# Patient Record
Sex: FEMALE | Race: WHITE | NOT HISPANIC OR LATINO | Employment: FULL TIME | URBAN - METROPOLITAN AREA
[De-identification: names, ages, dates, MRNs, and addresses within clinical notes are randomized per-mention and may not be internally consistent; named-entity substitution may affect disease eponyms.]

---

## 2018-07-24 ENCOUNTER — TRANSCRIBE ORDERS (OUTPATIENT)
Dept: ADMINISTRATIVE | Facility: HOSPITAL | Age: 48
End: 2018-07-24

## 2018-07-24 DIAGNOSIS — Z12.39 SCREENING BREAST EXAMINATION: Primary | ICD-10-CM

## 2018-07-24 DIAGNOSIS — Z00.8 HEALTH EXAMINATION IN POPULATION SURVEY: ICD-10-CM

## 2018-07-27 ENCOUNTER — APPOINTMENT (OUTPATIENT)
Dept: LAB | Facility: HOSPITAL | Age: 48
End: 2018-07-27
Payer: COMMERCIAL

## 2018-07-27 DIAGNOSIS — Z12.39 SCREENING BREAST EXAMINATION: ICD-10-CM

## 2018-07-27 DIAGNOSIS — Z00.8 HEALTH EXAMINATION IN POPULATION SURVEY: ICD-10-CM

## 2018-07-27 LAB
CHOLEST SERPL-MCNC: 122 MG/DL (ref 0–200)
EST. AVERAGE GLUCOSE BLD GHB EST-MCNC: 103 MG/DL
HBA1C MFR BLD: 5.2 % (ref 4.2–6.3)
HDLC SERPL-MCNC: 48 MG/DL (ref 40–60)
LDLC SERPL CALC-MCNC: 64 MG/DL (ref 0–100)
NONHDLC SERPL-MCNC: 74 MG/DL
TRIGL SERPL-MCNC: 51 MG/DL (ref 44–166)

## 2018-07-27 PROCEDURE — 83036 HEMOGLOBIN GLYCOSYLATED A1C: CPT

## 2018-07-27 PROCEDURE — 36415 COLL VENOUS BLD VENIPUNCTURE: CPT

## 2018-07-27 PROCEDURE — 80061 LIPID PANEL: CPT

## 2018-08-01 ENCOUNTER — HOSPITAL ENCOUNTER (OUTPATIENT)
Dept: MAMMOGRAPHY | Facility: HOSPITAL | Age: 48
Discharge: HOME | End: 2018-08-01
Attending: FAMILY MEDICINE
Payer: COMMERCIAL

## 2018-08-01 DIAGNOSIS — Z12.39 SCREENING BREAST EXAMINATION: ICD-10-CM

## 2018-08-01 PROCEDURE — 77067 SCR MAMMO BI INCL CAD: CPT

## 2018-08-01 PROCEDURE — 77063 BREAST TOMOSYNTHESIS BI: CPT

## 2019-11-01 ENCOUNTER — TRANSCRIBE ORDERS (OUTPATIENT)
Dept: LAB | Facility: HOSPITAL | Age: 49
End: 2019-11-01

## 2019-11-01 ENCOUNTER — APPOINTMENT (OUTPATIENT)
Dept: LAB | Facility: HOSPITAL | Age: 49
End: 2019-11-01
Payer: COMMERCIAL

## 2019-11-01 DIAGNOSIS — E55.9 VITAMIN D DEFICIENCY: ICD-10-CM

## 2019-11-01 DIAGNOSIS — Z13.220 SCREENING FOR LIPOID DISORDERS: ICD-10-CM

## 2019-11-01 DIAGNOSIS — E55.9 AVITAMINOSIS D: ICD-10-CM

## 2019-11-01 LAB
25(OH)D3 SERPL-MCNC: 36.3 NG/ML (ref 30–100)
ALBUMIN SERPL BCP-MCNC: 3.9 G/DL (ref 3.5–5.7)
ALP SERPL-CCNC: 52 U/L (ref 40–150)
ALT SERPL W P-5'-P-CCNC: 11 U/L (ref 7–52)
ANION GAP SERPL CALCULATED.3IONS-SCNC: 6 MMOL/L (ref 4–13)
AST SERPL W P-5'-P-CCNC: 12 U/L (ref 13–39)
BILIRUB SERPL-MCNC: 1 MG/DL (ref 0.2–1)
BUN SERPL-MCNC: 10 MG/DL (ref 7–25)
CALCIUM SERPL-MCNC: 9.2 MG/DL (ref 8.6–10.5)
CHLORIDE SERPL-SCNC: 107 MMOL/L (ref 98–107)
CHOLEST SERPL-MCNC: 130 MG/DL (ref 0–200)
CO2 SERPL-SCNC: 28 MMOL/L (ref 21–31)
CREAT SERPL-MCNC: 0.76 MG/DL (ref 0.6–1.2)
ERYTHROCYTE [DISTWIDTH] IN BLOOD BY AUTOMATED COUNT: 14.3 % (ref 11.5–14.5)
GFR SERPL CREATININE-BSD FRML MDRD: 92 ML/MIN/1.73SQ M
GLUCOSE P FAST SERPL-MCNC: 78 MG/DL (ref 65–99)
HCT VFR BLD AUTO: 43.8 % (ref 42–47)
HDLC SERPL-MCNC: 49 MG/DL
HGB BLD-MCNC: 14.3 G/DL (ref 12–16)
LDLC SERPL CALC-MCNC: 73 MG/DL (ref 0–100)
MCH RBC QN AUTO: 29.1 PG (ref 26–34)
MCHC RBC AUTO-ENTMCNC: 32.7 G/DL (ref 31–37)
MCV RBC AUTO: 89 FL (ref 81–99)
NONHDLC SERPL-MCNC: 81 MG/DL
PLATELET # BLD AUTO: 206 THOUSANDS/UL (ref 149–390)
PMV BLD AUTO: 8.3 FL (ref 8.6–11.7)
POTASSIUM SERPL-SCNC: 4 MMOL/L (ref 3.5–5.5)
PROT SERPL-MCNC: 6.6 G/DL (ref 6.4–8.9)
RBC # BLD AUTO: 4.92 MILLION/UL (ref 3.9–5.2)
SODIUM SERPL-SCNC: 141 MMOL/L (ref 134–143)
TRIGL SERPL-MCNC: 40 MG/DL (ref 44–166)
WBC # BLD AUTO: 6.3 THOUSAND/UL (ref 4.8–10.8)

## 2019-11-01 PROCEDURE — 80053 COMPREHEN METABOLIC PANEL: CPT

## 2019-11-01 PROCEDURE — 82306 VITAMIN D 25 HYDROXY: CPT

## 2019-11-01 PROCEDURE — 85027 COMPLETE CBC AUTOMATED: CPT

## 2019-11-01 PROCEDURE — 36415 COLL VENOUS BLD VENIPUNCTURE: CPT

## 2019-11-01 PROCEDURE — 80061 LIPID PANEL: CPT

## 2019-11-08 ENCOUNTER — TRANSCRIBE ORDERS (OUTPATIENT)
Dept: ADMINISTRATIVE | Facility: HOSPITAL | Age: 49
End: 2019-11-08

## 2019-11-08 DIAGNOSIS — Z12.31 SCREENING MAMMOGRAM FOR HIGH-RISK PATIENT: Primary | ICD-10-CM

## 2019-11-08 DIAGNOSIS — M81.0 OSTEOPOROSIS, UNSPECIFIED OSTEOPOROSIS TYPE, UNSPECIFIED PATHOLOGICAL FRACTURE PRESENCE: ICD-10-CM

## 2019-11-26 ENCOUNTER — HOSPITAL ENCOUNTER (OUTPATIENT)
Dept: MAMMOGRAPHY | Facility: HOSPITAL | Age: 49
Discharge: HOME | End: 2019-11-26
Payer: COMMERCIAL

## 2019-11-26 ENCOUNTER — HOSPITAL ENCOUNTER (OUTPATIENT)
Dept: BONE DENSITY | Facility: HOSPITAL | Age: 49
Discharge: HOME | End: 2019-11-26
Payer: COMMERCIAL

## 2019-11-26 VITALS — BODY MASS INDEX: 37.67 KG/M2 | HEIGHT: 67 IN | WEIGHT: 240 LBS

## 2019-11-26 DIAGNOSIS — M81.0 OSTEOPOROSIS, UNSPECIFIED OSTEOPOROSIS TYPE, UNSPECIFIED PATHOLOGICAL FRACTURE PRESENCE: ICD-10-CM

## 2019-11-26 DIAGNOSIS — Z12.31 SCREENING MAMMOGRAM FOR HIGH-RISK PATIENT: ICD-10-CM

## 2019-11-26 PROCEDURE — 77067 SCR MAMMO BI INCL CAD: CPT

## 2019-11-26 PROCEDURE — 77063 BREAST TOMOSYNTHESIS BI: CPT

## 2019-11-26 PROCEDURE — 77080 DXA BONE DENSITY AXIAL: CPT

## 2020-09-16 ENCOUNTER — TRANSCRIBE ORDERS (OUTPATIENT)
Dept: ADMINISTRATIVE | Facility: HOSPITAL | Age: 50
End: 2020-09-16

## 2020-09-16 DIAGNOSIS — R10.84 ABDOMINAL PAIN, GENERALIZED: Primary | ICD-10-CM

## 2020-10-14 ENCOUNTER — HOSPITAL ENCOUNTER (OUTPATIENT)
Dept: RADIOLOGY | Facility: HOSPITAL | Age: 50
Discharge: HOME | End: 2020-10-14
Payer: COMMERCIAL

## 2020-10-14 ENCOUNTER — TRANSCRIBE ORDERS (OUTPATIENT)
Dept: ADMINISTRATIVE | Facility: HOSPITAL | Age: 50
End: 2020-10-14

## 2020-10-14 DIAGNOSIS — M25.562 LEFT KNEE PAIN, UNSPECIFIED CHRONICITY: Primary | ICD-10-CM

## 2020-10-14 DIAGNOSIS — M25.562 LEFT KNEE PAIN, UNSPECIFIED CHRONICITY: ICD-10-CM

## 2020-10-14 PROCEDURE — 73562 X-RAY EXAM OF KNEE 3: CPT

## 2020-12-23 ENCOUNTER — IMMUNIZATIONS (OUTPATIENT)
Dept: FAMILY MEDICINE CLINIC | Facility: HOSPITAL | Age: 50
End: 2020-12-23
Payer: COMMERCIAL

## 2020-12-23 DIAGNOSIS — Z23 ENCOUNTER FOR IMMUNIZATION: ICD-10-CM

## 2020-12-23 PROCEDURE — 0011A SARS-COV-2 / COVID-19 MRNA VACCINE (MODERNA) 100 MCG: CPT

## 2020-12-23 PROCEDURE — 91301 SARS-COV-2 / COVID-19 MRNA VACCINE (MODERNA) 100 MCG: CPT

## 2021-01-15 ENCOUNTER — OFFICE VISIT (OUTPATIENT)
Dept: OBGYN CLINIC | Facility: CLINIC | Age: 51
End: 2021-01-15
Payer: COMMERCIAL

## 2021-01-15 VITALS
DIASTOLIC BLOOD PRESSURE: 89 MMHG | HEIGHT: 67 IN | HEART RATE: 69 BPM | SYSTOLIC BLOOD PRESSURE: 129 MMHG | BODY MASS INDEX: 41.75 KG/M2 | WEIGHT: 266 LBS

## 2021-01-15 DIAGNOSIS — M17.12 PRIMARY OSTEOARTHRITIS OF LEFT KNEE: Primary | ICD-10-CM

## 2021-01-15 PROCEDURE — 99203 OFFICE O/P NEW LOW 30 MIN: CPT | Performed by: ORTHOPAEDIC SURGERY

## 2021-01-15 NOTE — PROGRESS NOTES
Assessment:     1  Primary osteoarthritis of left knee          Plan:     Problem List Items Addressed This Visit        Musculoskeletal and Integument    Primary osteoarthritis of left knee - Primary    Relevant Orders    Ambulatory referral to Physical Therapy             14-year-old female with left knee osteoarthritis primarily the patellofemoral compartment but also the medial compartment  I reviewed the radiographs and diagnosis with her  We discussed the importance regular, low-impact exercise, quad muscle strengthening, weight loss, icing, anti-inflammatories  We also discussed possible injections  At this point she would like to try less invasive things 1st   If at any point she wishes to proceed with either cortisone or viscosupplementation be happy to proceed for her  Patient ID: Marcia Ashton is a 48 y o  female  Chief Complaint:  Left knee problems    HPI:  14-year-old female here today for pain in her left knee  This has been going on for several years but has been worsening  She is now at a point she cannot go up steps sequentially and has to go up one at a time  She has been taking Motrin regularly and using Voltaren gel which does not give her significant relief  She enjoys hiking but has been unable to do that because of this pain in her knee  She does fine if she sitting, but squatting and stairs cause her the most difficulty  She does not note any swelling  She has had no injury  She has not had any therapy or had any injections  She occasionally wears a brace which gives her a little bit of extra support  He is looking to see what else can be done for her knee pain  Allergy:  No Known Allergies    Medications:  all current active meds have been reviewed    Past Medical History:  History reviewed  No pertinent past medical history      Past Surgical History:  Past Surgical History:   Procedure Laterality Date    HYSTERECTOMY         Family History:  Family History Problem Relation Age of Onset    Diabetes Mother     Hypertension Mother     Hypertension Father     No Known Problems Sister     No Known Problems Maternal Grandmother     No Known Problems Maternal Grandfather     No Known Problems Paternal Grandmother     No Known Problems Paternal Grandfather     No Known Problems Sister     No Known Problems Sister     No Known Problems Sister     No Known Problems Sister     No Known Problems Maternal Aunt        Social History:  Social History     Substance and Sexual Activity   Alcohol Use None     Social History     Substance and Sexual Activity   Drug Use Not on file     Social History     Tobacco Use   Smoking Status Never Smoker   Smokeless Tobacco Never Used           ROS:  Review of Systems   Constitutional: Negative  Negative for chills and fever  HENT: Negative  Negative for ear pain and sore throat  Eyes: Negative  Negative for pain and visual disturbance  Respiratory: Negative  Negative for cough and shortness of breath  Cardiovascular: Negative  Negative for chest pain and palpitations  Gastrointestinal: Negative  Negative for abdominal pain and vomiting  Endocrine: Negative  Genitourinary: Negative  Negative for dysuria and hematuria  Musculoskeletal: Positive for arthralgias  Negative for back pain  Skin: Negative  Negative for color change and rash  Allergic/Immunologic: Negative  Neurological: Negative  Negative for seizures and syncope  Hematological: Negative  Psychiatric/Behavioral: Negative  All other systems reviewed and are negative  Objective:  BP Readings from Last 1 Encounters:   01/15/21 129/89      Wt Readings from Last 1 Encounters:   01/15/21 121 kg (266 lb)        BMI:   Estimated body mass index is 41 66 kg/m² as calculated from the following:    Height as of this encounter: 5' 7" (1 702 m)  Weight as of this encounter: 121 kg (266 lb)      EXAM:   Physical Exam  Vitals signs reviewed  Constitutional:       General: She is not in acute distress  Appearance: She is well-developed  She is not diaphoretic  HENT:      Head: Normocephalic and atraumatic  Eyes:      General:         Right eye: No discharge  Left eye: No discharge  Neck:      Musculoskeletal: Normal range of motion and neck supple  Trachea: No tracheal deviation  Cardiovascular:      Rate and Rhythm: Normal rate and regular rhythm  Pulmonary:      Effort: Pulmonary effort is normal  No respiratory distress  Breath sounds: Normal breath sounds  Chest:      Chest wall: No tenderness  Abdominal:      General: There is no distension  Palpations: Abdomen is soft  Tenderness: There is no abdominal tenderness  Musculoskeletal:      Right knee: She exhibits no effusion  Left knee: She exhibits no effusion  Skin:     General: Skin is warm and dry  Findings: No erythema  Neurological:      Mental Status: She is alert  Psychiatric:         Behavior: Behavior normal        Right Knee Exam   Right knee exam is normal     Muscle Strength   The patient has normal right knee strength  Tenderness   The patient is experiencing no tenderness  Range of Motion   The patient has normal right knee ROM  Tests   [place]:  Medial - negative Lateral - negative  Varus: negative Valgus: negative  Lachman:  Anterior - negative      Drawer:  Posterior - negative  Pivot shift: negative  Patellar apprehension: negative    Other   Erythema: absent  Sensation: normal  Pulse: present  Swelling: none  Effusion: no effusion present      Left Knee Exam   Left knee exam is normal     Tenderness   The patient is experiencing no tenderness  Range of Motion   The patient has normal left knee ROM      Tests   [place]:  Medial - negative Lateral - negative  Varus: negative Valgus: negative  Lachman:  Anterior - negative      Drawer:  Posterior - negative  Pivot shift: negative  Patellar apprehension: negative    Other   Erythema: absent  Sensation: normal  Pulse: present  Swelling: none  Effusion: no effusion present    Comments:    Mild positive patellar grind, crepitus with knee range of motion, quad atrophy              Radiographs:  I have personally reviewed pertinent films in PACS and my interpretation is  moderate degenerative changes of the left knee primarily the patellofemoral compartment with significant osteophyte formation in the medial compartment with mild osteophyte formation and joint space narrowing  Eugenio Rogers

## 2021-01-25 ENCOUNTER — IMMUNIZATIONS (OUTPATIENT)
Dept: FAMILY MEDICINE CLINIC | Facility: HOSPITAL | Age: 51
End: 2021-01-25

## 2021-01-25 DIAGNOSIS — Z23 ENCOUNTER FOR IMMUNIZATION: Primary | ICD-10-CM

## 2021-01-25 PROCEDURE — 91301 SARS-COV-2 / COVID-19 MRNA VACCINE (MODERNA) 100 MCG: CPT

## 2021-01-25 PROCEDURE — 0012A SARS-COV-2 / COVID-19 MRNA VACCINE (MODERNA) 100 MCG: CPT

## 2021-02-28 NOTE — PROGRESS NOTES
PT Evaluation     Today's date: 3/1/21  Patient name: Alejandrina Malave  : 1970  MRN: [id number]  Referring provider: Chelsea Martinez MD  Dx:   Encounter Diagnosis     ICD-10-CM    1  Primary osteoarthritis of left knee  M17 12                   Assessment  Assessment details: Alejandrina Malave is a 48 y o  female presenting to outpatient physical therapy with diagnosis of Primary osteoarthritis of left knee  (primary encounter diagnosis)  Patient's current impairments include  L knee pain, impaired soft tissue mobility, reduced L knee  range of motion, reduced L knee strength, and reduced activity tolerance  Patient's present functional limitations include  reliance on medication and/or modalities for pain relief, poor tolerance for functional mobility and activity, and difficulty completing work/HH/leisure activities  Patient to benefit from skilled outpatient physical therapy 2x/week for 6-8 weeks in order to reduce pain, maximize pain free range of motion, increase strength and stability, and improve functional mobility/functional activity in order to maximize return to prior level of function with reduced limitations  Thank you for your referral     Impairments: abnormal or restricted ROM, activity intolerance, impaired physical strength, lacks appropriate home exercise program and pain with function  Barriers to therapy: weight  Understanding of Dx/Px/POC: good   Prognosis: good    Goals  STGs to be achieved in 4 weeks:  1  Pt to demonstrate reduced subjective pain rating "at worst" by at least 2-3 points from Initial Eval in order to allow for reduced pain with ADLs and improved functional activity tolerance  2  Pt to demonstrate increased AROM of L knee  by at least 5-10 degrees in order to allow for greater ease and independence with ADLs and functional mobility     3  Pt to demonstrate foot over foot stair climing  in order to maximize joint mobility and function and allow for progression of exercise program and achievement of goals  4  Pt to demonstrate increased MMT of L quads/hams  by at least 1/2-1 grade in order to improve safety and stability with ADLs and functional mobility  LTGs to be achieved in 6-8 weeks:  1  Pt will be I with HEP in order to continue to improve quality of life and independence and reduce risk for re-injury  2  Pt to demonstrate return to hiking  without limitations or restrictions  3  Pt to demonstrate improved function as noted by achieving or exceeding predicted score on FOTO outcomes assessment tool  Plan  Patient would benefit from: skilled physical therapy  Planned modality interventions: cryotherapy  Planned therapy interventions: manual therapy, neuromuscular re-education, stretching, strengthening, patient education, therapeutic exercise, home exercise program, gait training and balance  Frequency: 2x week  Duration in weeks: 8  Plan of Care beginning date: 3/1/2021  Plan of Care expiration date: 2021  Treatment plan discussed with: patient        Subjective Evaluation    History of Present Illness  Mechanism of injury: 42-year-old female here today for pain in her left knee  This has been going on for several years but has been worsening  She is now at a point she cannot go up steps sequentially and has to go up one at a time  She has been taking Motrin regularly and using Voltaren gel which does not give her significant relief  She enjoys hiking but has been unable to do that because of this pain in her knee  She does fine  sitting, but squatting and stairs cause her the most difficulty  She does not note any swelling  She has had no injury  She has not had any therapy or had any injections  She occasionally wears a brace which gives her a little bit of extra support  She is looking to see what else can be done for her knee pain            Recurrent probem    Quality of life: fair    Pain  Current pain ratin  At best pain ratin  At worst pain ratin  Quality: tight  Relieving factors: medications, heat, rest and support (motrin)  Aggravating factors: stair climbing (bending knee to sleep)  Progression: worsening    Social Support  Steps to enter house: yes (13)  Stairs in house: no   Lives in: multiple-level home  Lives with: spouse    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[id number], manager)  Treatments  Current treatment: medication and physical therapy  Patient Goals  Patient goals for therapy: decreased pain, increased motion, increased strength and return to sport/leisure activities  Patient goal: hiking, getting in and out of [id number]        Objective     Observations     Additional Observation Details  Obese female    Palpation     Additional Palpation Details  TTP along medial patella  Mod crepitus L knee  Tight hamstrings    Tenderness     Right Knee   No tenderness in the medial joint line  Neurological Testing     Sensation     Knee   Left Knee   Intact: light touch and hot/cold discrimination    Right Knee   Intact: light touch and hot/cold discrimination     Active Range of Motion   Left Knee   Flexion: 122 degrees   Extension: 0 degrees     Right Knee   Flexion: 135 degrees   Extension: 0 degrees     Mobility   Patellar Mobility:     Right Knee   WFL: medial, lateral, superior and inferior    Strength/Myotome Testing     Left Knee   Flexion: 5  Extension: 4    Tests     Left Knee   Negative Apley's compression, patellar compression and patella-femoral grind       Swelling     Left Knee Girth Measurement (cm)   Joint line: 42 8 cm    Right Knee Girth Measurement (cm)   Joint line: 42 4 cm    Ambulation     Ambulation: Stairs   Ascend stairs: independent  Pattern: non-reciprocal  Railings: one rail  Descend stairs: independent  Pattern: reciprocal  Railings: one rail             Precautions: none    Re-eval Date: 3/29/21    Date 3/1       Visit Count 1       FOTO completed       Pain In See IE       Pain Out See IE               Manuals 3/1       Ham stretch        Quad stretch                        Neuro Re-Ed        [employer]        Tandem amb on foam                                                Ther Ex        aerobic NS L3  10'       SLRs 2 x 15       SAQs 30x       LAQs        Bridges on disk w/ball 2 x 10       S/L abd 2 x 15       Leg press        Leg ext mach        Leg flex mach        Standing march        steamboats        Fwd lunge        Lat lunge        Monster walk        Side step w/TB        Step ups  F/l/r        Ham stretch 5 x 30"               Incline calf stretch                Gait Training                        Modalities        CP prn

## 2021-03-01 ENCOUNTER — EVALUATION (OUTPATIENT)
Dept: PHYSICAL THERAPY | Facility: CLINIC | Age: 51
End: 2021-03-01
Payer: COMMERCIAL

## 2021-03-01 DIAGNOSIS — M17.12 PRIMARY OSTEOARTHRITIS OF LEFT KNEE: Primary | ICD-10-CM

## 2021-03-01 PROCEDURE — 97161 PT EVAL LOW COMPLEX 20 MIN: CPT

## 2021-03-01 PROCEDURE — 97110 THERAPEUTIC EXERCISES: CPT

## 2021-03-04 ENCOUNTER — OFFICE VISIT (OUTPATIENT)
Dept: PHYSICAL THERAPY | Facility: CLINIC | Age: 51
End: 2021-03-04
Payer: COMMERCIAL

## 2021-03-04 DIAGNOSIS — M17.12 PRIMARY OSTEOARTHRITIS OF LEFT KNEE: Primary | ICD-10-CM

## 2021-03-04 PROCEDURE — 97110 THERAPEUTIC EXERCISES: CPT

## 2021-03-04 NOTE — PROGRESS NOTES
Daily Note     Today's date: 3/4/2021  Patient name: Keeley Evans  : 1970  MRN: [id number]  Referring provider: Konstantin Salmon MD  Dx:   Encounter Diagnosis     ICD-10-CM    1  Primary osteoarthritis of left knee  M17 12                   Subjective: No complaints or concerns voiced by pt @ this time re: L knee  Says her Knee has been feeling pretty decent  Objective: See treatment diary below      Assessment: Tolerated treatment Well overall with performance of ther exer and self stretching  Would benefit from continued skilled therapy to achieve goals  Plan: Con't services 2x/week        Precautions: none    Re-eval Date: 3/29/21    Date 3/1 3 4 21      Visit Count 1 2      FOTO completed       Pain In See IE 0/10      Pain Out See IE 0/10              Manuals 3/1 3 4 21      Ham stretch  **Instructed for self 4x30"      Quad stretch  NV                      Neuro Re-Ed        Wobble board  NV      Tandem amb on foam                                                Ther Ex  3 4 21      aerobic NS L3  10' NS   L3  10'        SLRs 2 x 15  L 2 x 15        SAQs 30x L 30x/5"        LAQs  **30x/5"        Bridges on disk w/ball 2 x 10 2x10      S/L abd 2 x 15 L 2x15      Leg press        Leg ext mach        Leg flex mach        Standing march  30x Alt      steamboats        Fwd lunge        Lat lunge        Monster walk        Side step w/TB        Step ups  F/l/r  **L 1x15      Ham stretch 5 x 30" **L 5 x 30"              Incline calf stretch  **L 5 x 30"      ABD w/strap  **30x/5"      Gait Training                        Modalities  3 4 21      CP prn  *Pt declined offer for CP applic; will apply @ home, as needed

## 2021-03-08 ENCOUNTER — OFFICE VISIT (OUTPATIENT)
Dept: PHYSICAL THERAPY | Facility: CLINIC | Age: 51
End: 2021-03-08
Payer: COMMERCIAL

## 2021-03-08 DIAGNOSIS — M17.12 PRIMARY OSTEOARTHRITIS OF LEFT KNEE: Primary | ICD-10-CM

## 2021-03-08 PROCEDURE — 97110 THERAPEUTIC EXERCISES: CPT

## 2021-03-08 NOTE — PROGRESS NOTES
Daily Note     Today's date: 3/8/2021  Patient name: Vitaly Nicholas  : 1970  MRN: [id number]  Referring provider: Palmira Schaffer MD  Dx:   Encounter Diagnosis     ICD-10-CM    1  Primary osteoarthritis of left knee  M17 12                   Subjective:  No complaints or concerns voiced by pt  today re: L knee  Says she tolerated treatment well last visit, with no adverse effects other than some tightness @ L ant  shin/ankle region  Objective: See treatment diary below      Assessment: Tolerated treatment Well today overall with performance of ther exer, except for - squat down, which produced pain in L knee  Plan:  Con't services 2x/week           Precautions: none    Re-eval Date: 3/29/21    Date 3/1 3 4 21 3 8 21     Visit Count 1 2 3     FOTO completed       Pain In See IE 0/10 0/10     Pain Out See IE 0/10 0/10             Manuals 3/1 3 4 21 3 8 21     Ham stretch  **Instructed for self 4x30" self 4x30" L     Quad stretch  NV **4x/30" L                     Neuro Re-Ed        Wobble board   NV     Tandem amb on foam   NV                                             Ther Ex  3 4 21 3 8 21     aerobic NS L3  10'  NS   L3  10'       SLRs 2 x 15  L 2 x 15    L 2 x 15     SAQs 30x L 30x/5"   L 40x/5"     LAQs  **30x/5"   L 30x/5"     Bridges on disk w/ball 2 x 10 2x10 2x10     S/L abd 2 x 15 L 2x15 L 2x15     Leg press   NV     Leg ext mach        Leg flex mach        Standing march  30x Alt 30x Alt     steamboats   **L 1x20ea     Fwd lunge        Lat lunge        Monster walk        Side step w/TB        Step ups  F/l/r  **L 1x15 Fwd L  1x20     Ham stretch 5 x 30" **L 5 x 30" L 5 x 30"  *self             Incline calf stretch  **L 5 x 30" L 5 x 30"     ABD w/strap    Ball squeeze  **30x/5" 30x/5"  **Green TB  **30x/5"     Gait Training                        Modalities  3 4 21 3 8 21     CP prn  *Pt declined offer for CP applic; will apply @ home, as needed will apply @ home, as needed

## 2021-03-11 ENCOUNTER — OFFICE VISIT (OUTPATIENT)
Dept: PHYSICAL THERAPY | Facility: CLINIC | Age: 51
End: 2021-03-11
Payer: COMMERCIAL

## 2021-03-11 DIAGNOSIS — M17.12 PRIMARY OSTEOARTHRITIS OF LEFT KNEE: Primary | ICD-10-CM

## 2021-03-11 PROCEDURE — 97110 THERAPEUTIC EXERCISES: CPT | Performed by: PHYSICAL THERAPIST

## 2021-03-11 PROCEDURE — 97140 MANUAL THERAPY 1/> REGIONS: CPT

## 2021-03-11 NOTE — PROGRESS NOTES
Daily Note     Today's date: 3/11/2021  Patient name: Shania Mendiola  : 1970  MRN: [id number]  Referring provider: Eloisa Ramirez MD  Dx:   Encounter Diagnosis     ICD-10-CM    1  Primary osteoarthritis of left knee  M17 12                   Subjective: Squats aggrivates L knee  Going on vacation hiking in April      Objective: See treatment diary below      Assessment: Tolerated treatment well  Noted swelling/crepitis sub patella/ > distal patella region  Trial GISTM per MW/PT recommendation  Noted fair patella tracking with tightness noted >lateral  Patient would benefit from continued PT      Plan: Continue per plan of care  Precautions: none  Re-eval Date: 3/29/21    Date 3/1 3 4 21 3 8 21 3/11    Visit Count 1 2 3 4    FOTO completed       Pain In See IE 0/10 0/10 0    Pain Out See IE 0/10 0/10         GISTM to L patella/med/lat joint line/patella tendon with framing/sweeping/fannint, #4/3   - to pt tolerance   Pt positioned in seated  for comfort  [facility]      Pt educated potential bruising, increase soreness and encourage hydration      Manuals 3/1 3 4 21 3 8 21 10 min GISTM    Ham stretch  **Instructed for self 4x30" self 4x30" L 2x 1 min    Quad stretch  NV **4x/30" L Prone  2x 1 min                    Neuro Re-Ed        Wobble board   NV     Tandem amb on foam   NV                                             Ther Ex  3 4 21 3 8 21     aerobic NS L3  10'  NS   L3  10'   [address] [facility]  L 3 10 min    SLRs 2 x 15  L 2 x 15    L 2 x 15 L  2x10 4 way  2# place above knee    SAQs 30x L 30x/5"   L 40x/5"     LAQs  **30x/5"   L 30x/5"     Bridges on disk w/ball 2 x 10 2x10 2x10     S/L abd 2 x 15 L 2x15 L 2x15     Leg press   NV Squats  2x 10  Cues avoid knee over toes/ hinge@ pelvis with AH  2 HHA    Leg ext mach        Leg flex mach        Standing march  30x Alt 30x Alt     steamboats   **L 1x20ea     Fwd lunge        Lat lunge        Monster walk        Side step w/TB        Step ups  F/l/r  **L 1x15 Fwd L  1x20     Ham stretch 5 x 30" **L 5 x 30" L 5 x 30"  *self             Incline calf stretch  **L 5 x 30" L 5 x 30"     ABD w/strap    Ball squeeze  **30x/5" 30x/5"  **Green TB  **30x/5"     Gait Training                        Modalities  3 4 21 3 8 21     CP prn  *Pt declined offer for CP applic; will apply @ home, as needed will apply @ home, as needed

## 2021-03-15 ENCOUNTER — OFFICE VISIT (OUTPATIENT)
Dept: PHYSICAL THERAPY | Facility: CLINIC | Age: 51
End: 2021-03-15
Payer: COMMERCIAL

## 2021-03-15 ENCOUNTER — TRANSCRIBE ORDERS (OUTPATIENT)
Dept: LAB | Facility: HOSPITAL | Age: 51
End: 2021-03-15

## 2021-03-15 ENCOUNTER — APPOINTMENT (OUTPATIENT)
Dept: LAB | Facility: HOSPITAL | Age: 51
End: 2021-03-15
Attending: FAMILY MEDICINE
Payer: COMMERCIAL

## 2021-03-15 DIAGNOSIS — R10.84 ABDOMINAL PAIN, GENERALIZED: ICD-10-CM

## 2021-03-15 DIAGNOSIS — M17.12 PRIMARY OSTEOARTHRITIS OF LEFT KNEE: Primary | ICD-10-CM

## 2021-03-15 DIAGNOSIS — R10.84 ABDOMINAL PAIN, GENERALIZED: Primary | ICD-10-CM

## 2021-03-15 LAB
ALBUMIN SERPL BCP-MCNC: 3.8 G/DL (ref 3.5–5.7)
ALP SERPL-CCNC: 50 U/L (ref 40–150)
ALT SERPL W P-5'-P-CCNC: 11 U/L (ref 7–52)
ANION GAP SERPL CALCULATED.3IONS-SCNC: 10 MMOL/L (ref 4–13)
AST SERPL W P-5'-P-CCNC: 13 U/L (ref 13–39)
BILIRUB SERPL-MCNC: 0.5 MG/DL (ref 0.2–1)
BUN SERPL-MCNC: 12 MG/DL (ref 7–25)
CALCIUM SERPL-MCNC: 8.7 MG/DL (ref 8.6–10.5)
CHLORIDE SERPL-SCNC: 109 MMOL/L (ref 98–107)
CO2 SERPL-SCNC: 25 MMOL/L (ref 21–31)
CREAT SERPL-MCNC: 0.8 MG/DL (ref 0.6–1.2)
ERYTHROCYTE [DISTWIDTH] IN BLOOD BY AUTOMATED COUNT: 15.2 % (ref 11.5–14.5)
GFR SERPL CREATININE-BSD FRML MDRD: 86 ML/MIN/1.73SQ M
GLUCOSE P FAST SERPL-MCNC: 95 MG/DL (ref 65–99)
HCT VFR BLD AUTO: 42.4 % (ref 42–47)
HGB BLD-MCNC: 14 G/DL (ref 12–16)
MCH RBC QN AUTO: 29.2 PG (ref 26–34)
MCHC RBC AUTO-ENTMCNC: 33 G/DL (ref 31–37)
MCV RBC AUTO: 88 FL (ref 81–99)
PLATELET # BLD AUTO: 203 THOUSANDS/UL (ref 149–390)
PMV BLD AUTO: 8.3 FL (ref 8.6–11.7)
POTASSIUM SERPL-SCNC: 4 MMOL/L (ref 3.5–5.5)
PROT SERPL-MCNC: 6.4 G/DL (ref 6.4–8.9)
RBC # BLD AUTO: 4.79 MILLION/UL (ref 3.9–5.2)
SODIUM SERPL-SCNC: 144 MMOL/L (ref 134–143)
WBC # BLD AUTO: 5.2 THOUSAND/UL (ref 4.8–10.8)

## 2021-03-15 PROCEDURE — 36415 COLL VENOUS BLD VENIPUNCTURE: CPT

## 2021-03-15 PROCEDURE — 80053 COMPREHEN METABOLIC PANEL: CPT

## 2021-03-15 PROCEDURE — 85027 COMPLETE CBC AUTOMATED: CPT

## 2021-03-15 PROCEDURE — 97110 THERAPEUTIC EXERCISES: CPT

## 2021-03-15 NOTE — PROGRESS NOTES
Daily Note     Today's date: 3/15/2021  Patient name: Bettyjane Bence  : 1970  MRN: [id number]  Referring provider: Indra Rodas MD  Dx:   Encounter Diagnosis     ICD-10-CM    1  Primary osteoarthritis of left knee  M17 12                   Subjective:  Pt's only c/o is "mild ache" @ Med L knee  Objective: See treatment diary below      Assessment: Tolerated treatment Fairly Well to Well overall, except for stand squat, which created pain @ L med knee  Held on remaining reps for Squats due to sx when attempting to perform this exercise  Plan: Con't services 2x/week  Precautions: none  Re-eval Date: 3/29/21    Date 3/1 3 4 21 3 8 21 3/11 3 15 21   Visit Count 1 2 3 4 5   FOTO completed       Pain In See IE 0/10 0/10 0 0/10  Mild ache Med L knee   Pain Out See IE 0/10 0/10  0/10       GISTM to L patella/med/lat joint line/patella tendon with framing/sweeping/fannint, #4/3   - to pt tolerance *NP*  Pt positioned in seated  for comfort  [facility]/11/21      Pt educated potential bruising, increase soreness and encourage hydration      Manuals 3/1 3 4 21 3 8 21 10 min GISTM 3 15 21   Ham stretch  **Instructed for self 4x30" self 4x30" L 2x 1 min 2x 1 min   Quad stretch  NV **4x/30" L Prone  2x 1 min Prone  2x 1 min                   Neuro Re-Ed        Wobble board   NV  **F/B 30x side-side 30x   Tandem amb on foam   NV  NV                                           Ther Ex  3 4 21 3 8 21  3 15 21   aerobic NS L3  10'  NS   L3  10'   [address] [facility]  L 3 10 min [facility] [facility]  L 4 10 min   SLRs 2 x 15  L 2 x 15    L 2 x 15 L  2x10 4 way  2# place above knee L  2x10 4 way  2# place above knee   SAQs 30x L 30x/5"   L 40x/5"  L 40x/5"   LAQs  **30x/5"   L 30x/5"  L 40x/5"   Bridges on disk w/ball 2 x 10 2x10 2x10     S/L abd 2 x 15 L 2x15 L 2x15  L 2x15   Leg press   NV Squats  2x 10  Cues avoid knee over toes/ hinge@ pelvis with AH  2 HHA Hold   Leg ext mach        Leg flex mach        Standing march 30x Alt 30x Alt 30x Alt 40x Alt   steamboats   **L 1x20ea L 1x20ea L 1x25ea     Fwd lunge        Lat lunge        Monster walk        Side step w/TB        Step ups  F/l/r  **L 1x15 Fwd L  1x20 Fwd L  1x20 Fwd L  1x25   Ham stretch 5 x 30" **L 5 x 30" L 5 x 30"  *self L 5 x 30"  *self Manual   (see above)           Incline calf stretch  **L 5 x 30" L 5 x 30" L 5 x 30" L 5 x 30"   ABD w/strap    Ball squeeze  **30x/5" 30x/5"  **Green TB  **30x/5" 30x/5"  **Green TB  **30x/5" 30x/5"    30x/5"   Gait Training                        Modalities  3 4 21 3 8 21  3 15 21   CP prn  *Pt declined offer for CP applic; will apply @ home, as needed will apply @ home, as needed will apply @ home, as needed 10 min to L knee, w/LE elevated

## 2021-03-22 ENCOUNTER — OFFICE VISIT (OUTPATIENT)
Dept: PHYSICAL THERAPY | Facility: CLINIC | Age: 51
End: 2021-03-22
Payer: COMMERCIAL

## 2021-03-22 ENCOUNTER — HOSPITAL ENCOUNTER (OUTPATIENT)
Dept: CT IMAGING | Facility: HOSPITAL | Age: 51
Discharge: HOME | End: 2021-03-22
Attending: FAMILY MEDICINE
Payer: COMMERCIAL

## 2021-03-22 DIAGNOSIS — R10.84 ABDOMINAL PAIN, GENERALIZED: ICD-10-CM

## 2021-03-22 DIAGNOSIS — M17.12 PRIMARY OSTEOARTHRITIS OF LEFT KNEE: Primary | ICD-10-CM

## 2021-03-22 PROCEDURE — 74177 CT ABD & PELVIS W/CONTRAST: CPT

## 2021-03-22 PROCEDURE — G1004 CDSM NDSC: HCPCS

## 2021-03-22 PROCEDURE — 97140 MANUAL THERAPY 1/> REGIONS: CPT

## 2021-03-22 PROCEDURE — 97110 THERAPEUTIC EXERCISES: CPT

## 2021-03-22 RX ADMIN — IOHEXOL 100 ML: 350 INJECTION, SOLUTION INTRAVENOUS at 07:10

## 2021-03-22 NOTE — PROGRESS NOTES
Daily Note     Today's date: 3/22/2021  Patient name: Lupe Fajardo  : 1970  MRN: [id number]  Referring provider: Meaghan Garcia MD  Dx:   Encounter Diagnosis     ICD-10-CM    1  Primary osteoarthritis of left knee  M17 12                   Subjective:  Pt  states her L knee is not too bad right now  Objective: See treatment diary below  *Pictoral HEP provided*    Assessment: Tolerated treatment Fairly Well to Well overall with performance of ther exer  and NM Re-ed  Noted pain/sx  @ Med L knee when performing stand Squats  Plan:  Con't services 2x/week           Precautions: none  Re-eval Date: 3/29/21    Date 3 22 21 3 4 21 3 8 21 3/11 3 15 21   Visit Count 6 2 3 4 5   FOTO Due NV       Pain In 0/10  Mild ache, Med L knee 0/10 0/10 0 0/10  Mild ache Med L knee   Pain Out 0/10 0/10 0/10  0/10       GISTM to L patella/med/lat joint line/patella tendon with framing/sweeping/fannint, #4/3   - to pt tolerance *NP*        Manuals 3 22 21 3 4 21 3 8 21 10 min GISTM 3 15 21   Ham stretch 2x 1 min **Instructed for self 4x30" self 4x30" L 2x 1 min 2x 1 min   Quad stretch 2x 1 min NV **4x/30" L Prone  2x 1 min Prone  2x 1 min   Gastroc 2x 1 min       Knee Flex 2x 1 min       Neuro Re-Ed 3 22 21       Wobble board F/B 30x side-side 30x  NV  **F/B 30x side-side 30x   Tandem amb on foam **4x12 ft  NV  NV   Side step on Aero Mat **5x/12 ft       Tandem Stance **feet staggered, B position  10x/5"                               Ther Ex 3 22 21 3 4 21 3 8 21  3 15 21   aerobic SRC  L 4 10 min  NS   L3  10'   [facility]  L 3 10 min [facility]  L 4 10 min   SLRs *Reviewed for HEP  L 2 x 15    L 2 x 15 L  2x10 4 way  2# place above knee L  2x10 4 way  2# place above knee   SAQs *Reviewed for HEP L 30x/5"   L 40x/5"  L 40x/5"   LAQs *Reviewed for HEP **30x/5"   L 30x/5"  L 40x/5"   Bridges on disk w/ball 2 x 10 2x10 2x10     S/L abd *Reviewed for HEP L 2x15 L 2x15  L 2x15   Leg press NV  NV Squats  2x 10  Cues avoid knee over toes/ hinge@ pelvis with AH  2 HHA Hold   Leg ext mach NV         Leg flex mach NV       Standing march 40x Alt 30x Alt 30x Alt 30x Alt 40x Alt   steamboats L 1x25ea  **L 1x20ea L 1x20ea L 1x25ea     Fwd lunge        Lat lunge        Monster walk        Side step w/TB **On AeroMat  5x12ft       Step ups  F/l/r Fwd L  1x25  **Lat 1x10 **L 1x15 Fwd L  1x20 Fwd L  1x20 Fwd L  1x25   Ham stretch Manual   (see above) **L 5 x 30" L 5 x 30"  *self L 5 x 30"  *self Manual   (see above)           Incline calf stretch L 5 x 30" **L 5 x 30" L 5 x 30" L 5 x 30" L 5 x 30"   ABD w/strap    Ball squeeze 30x/5"    30x/5" **30x/5" 30x/5"  **Green TB  **30x/5" 30x/5"  **Green TB  **30x/5" 30x/5"    30x/5"   Gait Training                        Modalities 3 22 21 3 4 21 3 8 21  3 15 21   CP prn *Pt declined offer for CP applic; will apply @ home, as needed *Pt declined offer for CP applic; will apply @ home, as needed will apply @ home, as needed will apply @ home, as needed 10 min to L knee, w/LE elevated

## 2021-03-24 ENCOUNTER — OFFICE VISIT (OUTPATIENT)
Dept: OBGYN CLINIC | Facility: CLINIC | Age: 51
End: 2021-03-24
Payer: COMMERCIAL

## 2021-03-24 VITALS
DIASTOLIC BLOOD PRESSURE: 93 MMHG | HEART RATE: 82 BPM | WEIGHT: 270.4 LBS | BODY MASS INDEX: 42.44 KG/M2 | HEIGHT: 67 IN | SYSTOLIC BLOOD PRESSURE: 143 MMHG

## 2021-03-24 DIAGNOSIS — M17.12 PRIMARY OSTEOARTHRITIS OF LEFT KNEE: Primary | ICD-10-CM

## 2021-03-24 PROCEDURE — 20610 DRAIN/INJ JOINT/BURSA W/O US: CPT | Performed by: ORTHOPAEDIC SURGERY

## 2021-03-24 PROCEDURE — 99213 OFFICE O/P EST LOW 20 MIN: CPT | Performed by: ORTHOPAEDIC SURGERY

## 2021-03-24 RX ORDER — ASPIRIN 81 MG/1
81 TABLET ORAL DAILY
COMMUNITY

## 2021-03-24 RX ORDER — HYDROCHLOROTHIAZIDE 12.5 MG/1
12.5 TABLET ORAL DAILY
COMMUNITY

## 2021-03-24 RX ORDER — BETAMETHASONE SODIUM PHOSPHATE AND BETAMETHASONE ACETATE 3; 3 MG/ML; MG/ML
6 INJECTION, SUSPENSION INTRA-ARTICULAR; INTRALESIONAL; INTRAMUSCULAR; SOFT TISSUE
Status: COMPLETED | OUTPATIENT
Start: 2021-03-24 | End: 2021-03-24

## 2021-03-24 RX ORDER — LIDOCAINE HYDROCHLORIDE 10 MG/ML
5 INJECTION, SOLUTION INFILTRATION; PERINEURAL
Status: COMPLETED | OUTPATIENT
Start: 2021-03-24 | End: 2021-03-24

## 2021-03-24 RX ADMIN — LIDOCAINE HYDROCHLORIDE 5 ML: 10 INJECTION, SOLUTION INFILTRATION; PERINEURAL at 14:09

## 2021-03-24 RX ADMIN — BETAMETHASONE SODIUM PHOSPHATE AND BETAMETHASONE ACETATE 6 MG: 3; 3 INJECTION, SUSPENSION INTRA-ARTICULAR; INTRALESIONAL; INTRAMUSCULAR; SOFT TISSUE at 14:09

## 2021-03-24 NOTE — PROGRESS NOTES
Assessment:     1  Primary osteoarthritis of left knee          Plan:     Problem List Items Addressed This Visit        Musculoskeletal and Integument    Primary osteoarthritis of left knee - Primary             49-year-old female with left knee osteoarthritis primarily the patellofemoral compartment but also the medial compartment  I would like her to continue working with physical therapy as I do think getting her strength improved still will really help her  I did give her cortisone injection today  We discussed the option of viscosupplementation versus repeat cortisone injections in the future  She shows good understanding  Follow-up as needed  Patient ID: Cielo Erwin is a 48 y o  female  Chief Complaint:  Left knee problems    HPI:  49-year-old female here today for pain in her left knee  On her last visit she was diagnosed with osteoarthritis of the knee  She started working with physical therapy  She does note some improvement in her strength but still has trouble going up the steps sequentially  She is going on vacation in a little over a week and is interested in an injection prior to her leaving  She will be doing a lot of hiking during that time period      Allergy:  No Known Allergies    Medications:  all current active meds have been reviewed    Past Medical History:  History reviewed  No pertinent past medical history      Past Surgical History:  Past Surgical History:   Procedure Laterality Date    HYSTERECTOMY         Family History:  Family History   Problem Relation Age of Onset    Diabetes Mother     Hypertension Mother     Hypertension Father     No Known Problems Sister     No Known Problems Maternal Grandmother     No Known Problems Maternal Grandfather     No Known Problems Paternal Grandmother     No Known Problems Paternal Grandfather     No Known Problems Sister     No Known Problems Sister     No Known Problems Sister     No Known Problems Sister     No Known Problems Maternal Aunt        Social History:  Social History     Substance and Sexual Activity   Alcohol Use None     Social History     Substance and Sexual Activity   Drug Use Not on file     Social History     Tobacco Use   Smoking Status Never Smoker   Smokeless Tobacco Never Used           ROS:  Review of Systems   Musculoskeletal: Positive for arthralgias  All other systems reviewed and are negative  Objective:  BP Readings from Last 1 Encounters:   03/24/21 143/93      Wt Readings from Last 1 Encounters:   03/24/21 123 kg (270 lb 6 4 oz)        BMI:   Estimated body mass index is 42 35 kg/m² as calculated from the following:    Height as of this encounter: 5' 7" (1 702 m)  Weight as of this encounter: 123 kg (270 lb 6 4 oz)  EXAM:   Physical Exam  Right Knee Exam   Right knee exam is normal     Muscle Strength   The patient has normal right knee strength  Tenderness   The patient is experiencing no tenderness  Range of Motion   The patient has normal right knee ROM  Tests   [place]:  Medial - negative Lateral - negative  Varus: negative Valgus: negative  Lachman:  Anterior - negative      Drawer:  Posterior - negative  Pivot shift: negative  Patellar apprehension: negative    Other   Erythema: absent  Sensation: normal  Pulse: present  Swelling: none      Left Knee Exam   Left knee exam is normal     Tenderness   The patient is experiencing no tenderness  Range of Motion   The patient has normal left knee ROM      Tests   [place]:  Medial - negative Lateral - negative  Varus: negative Valgus: negative  Lachman:  Anterior - negative      Drawer:  Posterior - negative  Pivot shift: negative  Patellar apprehension: negative    Other   Erythema: absent  Sensation: normal  Pulse: present  Swelling: none    Comments:    Mild positive patellar grind, crepitus with knee range of motion, quad atrophy                Radiographs:  I have personally reviewed pertinent films in PACS and my interpretation is  moderate degenerative changes of the left knee primarily the patellofemoral compartment with significant osteophyte formation in the medial compartment with mild osteophyte formation and joint space narrowing       Large joint arthrocentesis: L knee  Universal Protocol:  Consent given by: patient  Time out: Immediately prior to procedure a "time out" was called to verify the correct patient, procedure, equipment, support staff and site/side marked as required    Supporting Documentation  Indications: pain   Procedure Details  Location: knee - L knee  Preparation: Patient was prepped and draped in the usual sterile fashion  Needle size: 22 G  Ultrasound guidance: no  Approach: superior  Medications administered: 6 mg betamethasone acetate-betamethasone sodium phosphate 6 (3-3) mg/mL; 5 mL lidocaine 1 %    Patient tolerance: patient tolerated the procedure well with no immediate complications  Dressing:  Sterile dressing applied

## 2021-03-25 ENCOUNTER — OFFICE VISIT (OUTPATIENT)
Dept: PHYSICAL THERAPY | Facility: CLINIC | Age: 51
End: 2021-03-25
Payer: COMMERCIAL

## 2021-03-25 DIAGNOSIS — M17.12 PRIMARY OSTEOARTHRITIS OF LEFT KNEE: Primary | ICD-10-CM

## 2021-03-25 PROCEDURE — 97110 THERAPEUTIC EXERCISES: CPT

## 2021-03-25 PROCEDURE — 97112 NEUROMUSCULAR REEDUCATION: CPT

## 2021-03-25 NOTE — PROGRESS NOTES
Daily Note     Today's date: 3/25/2021  Patient name: Lynette Mart  : 1970  MRN: [id number]  Referring provider: Dom Daly MD  Dx:   Encounter Diagnosis     ICD-10-CM    1  Primary osteoarthritis of left knee  M17 12                   Subjective:  Pt  reports she received cortisone injection yesterday for L Knee by Ortho MD   Pt  states she is noting good results thus far  Objective: See treatment diary below      Assessment: Tolerated treatment Fairly Well overall with performance of ther exer  Able to progress with exercise program w/o any issues  Plan:  Con't services 2x/week           Precautions: none  Re-eval Date: 3/29/21    Date 3 22 21 3 25 21 3 8 21 3/11 3 15 21   Visit Count 6 7 3 4 5   FOTO Due NV **completed      Pain In 0/10  Mild ache, Med L knee 0/10  "just a little tight" 0/10 0 0/10  Mild ache Med L knee   Pain Out 0/10 0/10 0/10  0/10       GISTM to L patella/med/lat joint line/patella tendon with framing/sweeping/fannint, #4/3   - to pt tolerance *NP*        Manuals 3 22 21 3 25 21 3 8 21 10 min GISTM 3 15 21   Ham stretch 2x 1 min Self self 4x30" L 2x 1 min 2x 1 min   Quad stretch 2x 1 min Self HEP **4x/30" L Prone  2x 1 min Prone  2x 1 min   Gastroc 2x 1 min **On incline board  L 6x/30"      Knee Flex 2x 1 min **on 2nd step  10x/10"      Neuro Re-Ed 3 22 21 3 25 21      Wobble board F/B 30x side-side 30x F/B 30x-Unilat hold side-side 30x Unilat Hold NV  **F/B 30x side-side 30x   Tandem amb on foam **4x12 ft 4x12 ft NV  NV   Side step on Aero Mat **5x/12 ft 5x12 ft      Tandem Stance **feet staggered, B position  10x/5" feet staggered, B position  10x/10"                              Ther Ex 3 22 21 3 25 21 3 8 21  3 15 21   aerobic SRC  L 4 10 min SRC  Gear 5   10 min NS   L3  10'   [address] [facility]  L 3 10 min SRC  L 4 10 min   SLRs *Reviewed for HEP HEP    L 2 x 15 L  2x10 4 way  2# place above knee L  2x10 4 way  2# place above knee   SAQs *Reviewed for HEP HEP   L 40x/5"  L 40x/5"   LAQs *Reviewed for HEP HEP   L 30x/5"  L 40x/5"   Bridges on disk w/ball 2 x 10 3x10/3" 2x10     S/L abd *Reviewed for HEP HEP L 2x15  L 2x15   Leg press NV **70# 30x NV Squats  2x 10  Cues avoid knee over toes/ hinge@ pelvis with AH  2 HHA Hold   Leg ext mach NV   **22# 3x10      Leg flex mach NV **33# 3x10      Standing march 40x Alt 45x Alt 30x Alt 30x Alt 40x Alt   steamboats L 1x25ea 1x30ea B **L 1x20ea L 1x20ea L 1x25ea     Fwd lunge        Lat lunge        Monster walk        Side step w/TB **On AeroMat  5x12ft On AeroMat  5x12ft      Step ups  F/l/r Fwd L  1x25  **Lat 1x10 Fwd 1x30  Lat 20x Fwd L  1x20 Fwd L  1x20 Fwd L  1x25   Ham stretch Manual   (see above) Self L 5 x 30"  *self L 5 x 30"  *self Manual   (see above)   Stand squat  **1/4 squat  2x10      Incline calf stretch L 5 x 30" L 6 x 30" L 5 x 30" L 5 x 30" L 5 x 30"   ABD w/strap    Ball squeeze 30x/5"    30x/5" Resume    resume 30x/5"  **Green TB  **30x/5" 30x/5"  **Green TB  **30x/5" 30x/5"    30x/5"   Gait Training                        Modalities 3 22 21 3 25 21 3 8 21  3 15 21   CP prn *Pt declined offer for CP applic; will apply @ home, as needed *Pt declined offer for CP applic; will apply @ home, as needed will apply @ home, as needed will apply @ home, as needed 10 min to L knee, w/LE elevated

## 2021-03-29 ENCOUNTER — EVALUATION (OUTPATIENT)
Dept: PHYSICAL THERAPY | Facility: CLINIC | Age: 51
End: 2021-03-29
Payer: COMMERCIAL

## 2021-03-29 DIAGNOSIS — M17.12 PRIMARY OSTEOARTHRITIS OF LEFT KNEE: Primary | ICD-10-CM

## 2021-03-29 PROCEDURE — 97110 THERAPEUTIC EXERCISES: CPT

## 2021-03-29 PROCEDURE — 97112 NEUROMUSCULAR REEDUCATION: CPT

## 2021-03-29 NOTE — PROGRESS NOTES
PT Re-Evaluation  and PT Discharge    Today's date: 3/1/21  Patient name: Alejandrina Malave  : 1970  MRN: [id number]  Referring provider: Chelsea Martinez MD  Dx:   No diagnosis found  Assessment  Assessment details: Alejandrina Malave is a 48 y o  female presenting to outpatient physical therapy with diagnosis of Primary osteoarthritis of left knee  (primary encounter diagnosis)  Patient's current impairments include  L knee pain, impaired soft tissue mobility, reduced L knee  range of motion, reduced L knee strength, and reduced activity tolerance  Patient's present functional limitations include  reliance on medication and/or modalities for pain relief, poor tolerance for functional mobility and activity, and difficulty completing work/HH/leisure activities  Patient to benefit from skilled outpatient physical therapy 2x/week for 6-8 weeks in order to reduce pain, maximize pain free range of motion, increase strength and stability, and improve functional mobility/functional activity in order to maximize return to prior level of function with reduced limitations  Thank you for your referral     3/29/21 PDATE: Pt notes approx 50% improvement since [facility] with increased strength and AROM L knee  She notes improved ability to climb stairs with less pain and improved ability to squat  She will be going on a 2 week vacation invoving a lot of hiking  Pt wishes to be placed on hold at this time  She will see how she fares on her vacation and will call us upon return if she needs to resume PT  Impairments: abnormal or restricted ROM, activity intolerance, impaired physical strength, lacks appropriate home exercise program and pain with function  Barriers to therapy: weight  Understanding of Dx/Px/POC: good   Prognosis: good    Goals  STGs to be achieved in 4 weeks:  1   Pt to demonstrate reduced subjective pain rating "at worst" by at least 2-3 points from Initial Eval in order to allow for reduced pain with ADLs and improved functional activity tolerance  MET  2  Pt to demonstrate increased AROM of L knee  by at least 5-10 degrees in order to allow for greater ease and independence with ADLs and functional mobility  MET  3  Pt to demonstrate foot over foot stair climing  in order to maximize joint mobility and function and allow for progression of exercise program and achievement of goals  MET  4  Pt to demonstrate increased MMT of L quads/hams  by at least 1/2-1 grade in order to improve safety and stability with ADLs and functional mobility  MET    LTGs to be achieved in 6-8 weeks:  1  Pt will be I with HEP in order to continue to improve quality of life and independence and reduce risk for re-injury  MET  2  Pt to demonstrate return to hiking  without limitations or restrictions  3  Pt to demonstrate improved function as noted by achieving or exceeding predicted score on FOTO outcomes assessment tool  Plan  Plan details: Will hold PT chart for 1 month in case symptoms worsen  Pt to go on 2 week hiking vacation with 23 yo son and is unsure how she will feel afterward  4/26/21  DC PT        Subjective Evaluation    History of Present Illness  Mechanism of injury: 43-year-old female here today for pain in her left knee  This has been going on for several years but has been worsening  She is now at a point she cannot go up steps sequentially and has to go up one at a time  She has been taking Motrin regularly and using Voltaren gel which does not give her significant relief  She enjoys hiking but has been unable to do that because of this pain in her knee  She does fine  sitting, but squatting and stairs cause her the most difficulty  She does not note any swelling  She has had no injury  She has not had any therapy or had any injections  She occasionally wears a brace which gives her a little bit of extra support  She is looking to see what else can be done for her knee pain    3/29/21 UPDATE: Pt states she feels ~ 50% beteer than at [facility]  She notes improved ability to climb stairs with less pain, she is able to squat with less pain than at [facility], Pt had a cortisone injection last week which has helped as well  Still "feels it" when squatting more than 10 reps  Taking less motrin than at [facility]  Recurrent probem    Quality of life: good    Pain  Current pain ratin  At best pain ratin  At worst pain ratin  Quality: tight  Relieving factors: medications, heat, rest and support (motrin)  Aggravating factors: stair climbing (bending knee to sleep)  Progression: worsening    Social Support  Steps to enter house: yes (13)  Stairs in house: no   Lives in: multiple-level home  Lives with: spouse    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[id number], manager)  Treatments  Current treatment: medication and physical therapy  Patient Goals  Patient goals for therapy: decreased pain, increased motion, increased strength and return to sport/leisure activities  Patient goal: hiking, getting in and out of kayak        Objective     Observations     Additional Observation Details  Obese female    Palpation     Additional Palpation Details  TTP along medial patella  Mod crepitus L knee  Tight hamstrings    Tenderness     Right Knee   No tenderness in the medial joint line  Neurological Testing     Sensation     Knee   Left Knee   Intact: light touch and hot/cold discrimination    Right Knee   Intact: light touch and hot/cold discrimination     Active Range of Motion   Left Knee   Flexion: 130 degrees   Extension: 0 degrees     Right Knee   Flexion: 135 degrees   Extension: 0 degrees     Mobility   Patellar Mobility:     Right Knee   WFL: medial, lateral, superior and inferior    Strength/Myotome Testing     Left Knee   Flexion: 5  Extension: 5    Right Knee   Normal strength    Tests     Left Knee   Negative Apley's compression, patellar compression and patella-femoral grind       Swelling     Left Knee Girth Measurement (cm) Joint line: 42 8 cm    Right Knee Girth Measurement (cm)   Joint line: 42 4 cm    Ambulation     Ambulation: Stairs   Ascend stairs: independent  Pattern: non-reciprocal  Railings: one rail  Descend stairs: independent  Pattern: reciprocal  Railings: one rail             Precautions: none    Re-eval Date: 3/29/21      Date 3 22 21 3 25 21    3/29     Visit Count 6 7   8     FOTO Due NV **completed      Pain In 0/10  Mild ache, Med L knee 0/10  "just a little tight"      Pain Out 0/10 0/10          Manuals 3 22 21 3 25 21 3/29     Ham stretch 2x 1 min Self      Quad stretch 2x 1 min Self HEP      Gastroc 2x 1 min **On incline board  L 6x/30" Incline    6 x 30"     Knee Flex 2x 1 min **on 2nd step  10x/10"      Neuro Re-Ed 3 22 21 3 25 21 3/29     Wobble board F/B 30x side-side 30x F/B 30x-Unilat hold side-side 30x Unilat Hold F/B 30x-Unilat hold side-side 30x Unilat Hold     Tandem amb on foam **4x12 ft 4x12 ft 5 x 12 ft     Side step on Aero Mat **5x/12 ft 5x12 ft 5 x 12 ft     Tandem Stance **feet staggered, B position  10x/5" feet staggered, B position  10x/10" feet staggered, B position  10x/10"                             Ther Ex 3 22 21 3 25 21 3/29 21     aerobic SRC  L 4 10 min SRC  Gear 5   10 min NS  L4 10'     SLRs *Reviewed for HEP HEP        SAQs *Reviewed for HEP HEP        LAQs *Reviewed for HEP HEP        Bridges on disk w/ball 2 x 10 3x10/3"      S/L abd *Reviewed for HEP HEP      Leg press NV **70# 30x 70#  30x     Leg ext mach NV   **22# 3x10 22#  30x      Leg flex mach NV **33# 3x10 33#  30x     Standing march 40x Alt 45x Alt   45x alt     steamboats L 1x25ea 1x30ea B x30  B     Fwd lunge        Lat lunge        Monster walk        Side step w/TB **On AeroMat  5x12ft On AeroMat  5x12ft      Step ups  F/l/r Fwd L  1x25  **Lat 1x10 Fwd 1x30  Lat 20x   Fwd  30    Lat 30     Ham stretch Manual   (see above) Self      Stand squat  **1/4 squat  2x10 1/4  20x     Incline calf stretch L 5 x 30" L 6 x 30"  L 6 x 30"     ABD w/strap    Ball squeeze 30x/5"    30x/5" Resume    resume      Gait Training                        Modalities 3 22 21 3 25 21      CP prn *Pt declined offer for CP applic; will apply @ home, as needed *Pt declined offer for CP applic; will apply @ home, as needed

## 2021-07-15 ENCOUNTER — TELEPHONE (OUTPATIENT)
Dept: BARIATRICS | Facility: CLINIC | Age: 51
End: 2021-07-15

## 2021-07-15 NOTE — TELEPHONE ENCOUNTER
Called pt to discuss revision process/ incoming transfer  Transfer sheet placed in file cabinet under "M", until records are received

## 2021-08-02 ENCOUNTER — APPOINTMENT (EMERGENCY)
Dept: CT IMAGING | Facility: HOSPITAL | Age: 51
End: 2021-08-02
Payer: COMMERCIAL

## 2021-08-02 ENCOUNTER — APPOINTMENT (EMERGENCY)
Dept: RADIOLOGY | Facility: HOSPITAL | Age: 51
End: 2021-08-02
Payer: COMMERCIAL

## 2021-08-02 ENCOUNTER — HOSPITAL ENCOUNTER (EMERGENCY)
Facility: HOSPITAL | Age: 51
End: 2021-08-03
Attending: EMERGENCY MEDICINE | Admitting: EMERGENCY MEDICINE
Payer: COMMERCIAL

## 2021-08-02 DIAGNOSIS — K56.609 SBO (SMALL BOWEL OBSTRUCTION) (HCC): Primary | ICD-10-CM

## 2021-08-02 DIAGNOSIS — R11.2 NAUSEA AND VOMITING: ICD-10-CM

## 2021-08-02 LAB
ALBUMIN SERPL BCP-MCNC: 4.1 G/DL (ref 3.5–5.7)
ALP SERPL-CCNC: 63 U/L (ref 40–150)
ALT SERPL W P-5'-P-CCNC: 10 U/L (ref 7–52)
ANION GAP SERPL CALCULATED.3IONS-SCNC: 10 MMOL/L (ref 4–13)
AST SERPL W P-5'-P-CCNC: 11 U/L (ref 13–39)
BACTERIA UR QL AUTO: ABNORMAL /HPF
BILIRUB SERPL-MCNC: 1.2 MG/DL (ref 0.2–1)
BILIRUB UR QL STRIP: NEGATIVE
BUN SERPL-MCNC: 11 MG/DL (ref 7–25)
CALCIUM SERPL-MCNC: 9.5 MG/DL (ref 8.6–10.5)
CHLORIDE SERPL-SCNC: 102 MMOL/L (ref 98–107)
CLARITY UR: CLEAR
CO2 SERPL-SCNC: 24 MMOL/L (ref 21–31)
COLOR UR: YELLOW
CREAT SERPL-MCNC: 0.79 MG/DL (ref 0.6–1.2)
ERYTHROCYTE [DISTWIDTH] IN BLOOD BY AUTOMATED COUNT: 14.3 % (ref 11.5–14.5)
GFR SERPL CREATININE-BSD FRML MDRD: 87 ML/MIN/1.73SQ M
GLUCOSE SERPL-MCNC: 153 MG/DL (ref 65–99)
GLUCOSE UR STRIP-MCNC: NEGATIVE MG/DL
HCT VFR BLD AUTO: 51.6 % (ref 42–47)
HGB BLD-MCNC: 17.2 G/DL (ref 12–16)
HGB UR QL STRIP.AUTO: ABNORMAL
KETONES UR STRIP-MCNC: ABNORMAL MG/DL
LACTATE SERPL-SCNC: 1.4 MMOL/L (ref 0.5–2)
LEUKOCYTE ESTERASE UR QL STRIP: NEGATIVE
LIPASE SERPL-CCNC: <10 U/L (ref 11–82)
LYMPHOCYTES # BLD AUTO: 1.42 THOUSAND/UL (ref 0.6–4.47)
LYMPHOCYTES # BLD AUTO: 9 % (ref 20–51)
MCH RBC QN AUTO: 29.4 PG (ref 26–34)
MCHC RBC AUTO-ENTMCNC: 33.4 G/DL (ref 31–37)
MCV RBC AUTO: 88 FL (ref 81–99)
NEUTS SEG # BLD: 14.38 THOUSAND/UL (ref 1.81–6.82)
NEUTS SEG NFR BLD AUTO: 91 % (ref 42–75)
NITRITE UR QL STRIP: NEGATIVE
NON-SQ EPI CELLS URNS QL MICRO: ABNORMAL /HPF
PH UR STRIP.AUTO: 6.5 [PH]
PLATELET # BLD AUTO: 267 THOUSANDS/UL (ref 149–390)
PLATELET BLD QL SMEAR: ADEQUATE
PMV BLD AUTO: 8 FL (ref 8.6–11.7)
POTASSIUM SERPL-SCNC: 3.8 MMOL/L (ref 3.5–5.5)
PROT SERPL-MCNC: 7.3 G/DL (ref 6.4–8.9)
PROT UR STRIP-MCNC: NEGATIVE MG/DL
RBC # BLD AUTO: 5.87 MILLION/UL (ref 3.9–5.2)
RBC #/AREA URNS AUTO: ABNORMAL /HPF
RBC MORPH BLD: NORMAL
SODIUM SERPL-SCNC: 136 MMOL/L (ref 134–143)
SP GR UR STRIP.AUTO: <=1.005 (ref 1–1.03)
TOTAL CELLS COUNTED SPEC: 100
UROBILINOGEN UR QL STRIP.AUTO: 0.2 E.U./DL
WBC # BLD AUTO: 15.8 THOUSAND/UL (ref 4.8–10.8)
WBC #/AREA URNS AUTO: ABNORMAL /HPF

## 2021-08-02 PROCEDURE — 96361 HYDRATE IV INFUSION ADD-ON: CPT

## 2021-08-02 PROCEDURE — 80053 COMPREHEN METABOLIC PANEL: CPT | Performed by: EMERGENCY MEDICINE

## 2021-08-02 PROCEDURE — 36415 COLL VENOUS BLD VENIPUNCTURE: CPT | Performed by: EMERGENCY MEDICINE

## 2021-08-02 PROCEDURE — 99285 EMERGENCY DEPT VISIT HI MDM: CPT

## 2021-08-02 PROCEDURE — 99244 OFF/OP CNSLTJ NEW/EST MOD 40: CPT | Performed by: SURGERY

## 2021-08-02 PROCEDURE — 85007 BL SMEAR W/DIFF WBC COUNT: CPT | Performed by: EMERGENCY MEDICINE

## 2021-08-02 PROCEDURE — 74177 CT ABD & PELVIS W/CONTRAST: CPT

## 2021-08-02 PROCEDURE — 81001 URINALYSIS AUTO W/SCOPE: CPT | Performed by: EMERGENCY MEDICINE

## 2021-08-02 PROCEDURE — 99285 EMERGENCY DEPT VISIT HI MDM: CPT | Performed by: EMERGENCY MEDICINE

## 2021-08-02 PROCEDURE — NC001 PR NO CHARGE: Performed by: SURGERY

## 2021-08-02 PROCEDURE — 71045 X-RAY EXAM CHEST 1 VIEW: CPT

## 2021-08-02 PROCEDURE — 85027 COMPLETE CBC AUTOMATED: CPT | Performed by: EMERGENCY MEDICINE

## 2021-08-02 PROCEDURE — 99284 EMERGENCY DEPT VISIT MOD MDM: CPT | Performed by: SURGERY

## 2021-08-02 PROCEDURE — 96375 TX/PRO/DX INJ NEW DRUG ADDON: CPT

## 2021-08-02 PROCEDURE — 96374 THER/PROPH/DIAG INJ IV PUSH: CPT

## 2021-08-02 PROCEDURE — 83690 ASSAY OF LIPASE: CPT | Performed by: EMERGENCY MEDICINE

## 2021-08-02 PROCEDURE — G1004 CDSM NDSC: HCPCS

## 2021-08-02 PROCEDURE — 83605 ASSAY OF LACTIC ACID: CPT | Performed by: EMERGENCY MEDICINE

## 2021-08-02 RX ORDER — METOCLOPRAMIDE HYDROCHLORIDE 5 MG/ML
10 INJECTION INTRAMUSCULAR; INTRAVENOUS ONCE
Status: COMPLETED | OUTPATIENT
Start: 2021-08-02 | End: 2021-08-02

## 2021-08-02 RX ORDER — ONDANSETRON 2 MG/ML
4 INJECTION INTRAMUSCULAR; INTRAVENOUS ONCE
Status: COMPLETED | OUTPATIENT
Start: 2021-08-02 | End: 2021-08-02

## 2021-08-02 RX ORDER — HYDROMORPHONE HCL/PF 1 MG/ML
1 SYRINGE (ML) INJECTION ONCE
Status: COMPLETED | OUTPATIENT
Start: 2021-08-02 | End: 2021-08-02

## 2021-08-02 RX ORDER — KETOROLAC TROMETHAMINE 30 MG/ML
15 INJECTION, SOLUTION INTRAMUSCULAR; INTRAVENOUS ONCE
Status: COMPLETED | OUTPATIENT
Start: 2021-08-02 | End: 2021-08-02

## 2021-08-02 RX ADMIN — SODIUM CHLORIDE 1000 ML: 0.9 INJECTION, SOLUTION INTRAVENOUS at 19:53

## 2021-08-02 RX ADMIN — ONDANSETRON 4 MG: 2 INJECTION INTRAMUSCULAR; INTRAVENOUS at 19:49

## 2021-08-02 RX ADMIN — KETOROLAC TROMETHAMINE 15 MG: 30 INJECTION, SOLUTION INTRAMUSCULAR; INTRAVENOUS at 19:49

## 2021-08-02 RX ADMIN — IOHEXOL 100 ML: 350 INJECTION, SOLUTION INTRAVENOUS at 20:47

## 2021-08-02 RX ADMIN — HYDROMORPHONE HYDROCHLORIDE 1 MG: 1 INJECTION, SOLUTION INTRAMUSCULAR; INTRAVENOUS; SUBCUTANEOUS at 20:59

## 2021-08-02 RX ADMIN — METOCLOPRAMIDE 10 MG: 5 INJECTION, SOLUTION INTRAMUSCULAR; INTRAVENOUS at 20:59

## 2021-08-02 NOTE — Clinical Note
Case was discussed with Gen Surg and the patient's admission status was agreed to be Admission Status: inpatient status to the service of Dr Jimbo Oliveira

## 2021-08-03 ENCOUNTER — ANESTHESIA EVENT (OUTPATIENT)
Dept: PERIOP | Facility: HOSPITAL | Age: 51
DRG: 394 | End: 2021-08-03
Payer: COMMERCIAL

## 2021-08-03 ENCOUNTER — ANESTHESIA (OUTPATIENT)
Dept: PERIOP | Facility: HOSPITAL | Age: 51
DRG: 394 | End: 2021-08-03
Payer: COMMERCIAL

## 2021-08-03 ENCOUNTER — HOSPITAL ENCOUNTER (INPATIENT)
Facility: HOSPITAL | Age: 51
LOS: 2 days | Discharge: HOME | DRG: 394 | End: 2021-08-05
Attending: SURGERY | Admitting: SURGERY
Payer: COMMERCIAL

## 2021-08-03 VITALS
DIASTOLIC BLOOD PRESSURE: 75 MMHG | BODY MASS INDEX: 42.29 KG/M2 | SYSTOLIC BLOOD PRESSURE: 117 MMHG | TEMPERATURE: 97.8 F | HEART RATE: 85 BPM | OXYGEN SATURATION: 98 % | WEIGHT: 270 LBS | RESPIRATION RATE: 16 BRPM

## 2021-08-03 DIAGNOSIS — R10.33 PERIUMBILICAL ABDOMINAL PAIN: ICD-10-CM

## 2021-08-03 DIAGNOSIS — R11.0 NAUSEA: ICD-10-CM

## 2021-08-03 DIAGNOSIS — K56.609 SMALL BOWEL OBSTRUCTION (HCC): ICD-10-CM

## 2021-08-03 DIAGNOSIS — E66.01 MORBID OBESITY (HCC): ICD-10-CM

## 2021-08-03 DIAGNOSIS — M17.12 PRIMARY OSTEOARTHRITIS OF LEFT KNEE: Primary | ICD-10-CM

## 2021-08-03 LAB
ALBUMIN SERPL BCP-MCNC: 3.2 G/DL (ref 3.5–5)
ALP SERPL-CCNC: 68 U/L (ref 46–116)
ALT SERPL W P-5'-P-CCNC: 20 U/L (ref 12–78)
ANION GAP SERPL CALCULATED.3IONS-SCNC: 11 MMOL/L (ref 4–13)
AST SERPL W P-5'-P-CCNC: 12 U/L (ref 5–45)
BASOPHILS # BLD AUTO: 0.06 THOUSANDS/ΜL (ref 0–0.1)
BASOPHILS NFR BLD AUTO: 1 % (ref 0–1)
BILIRUB SERPL-MCNC: 0.93 MG/DL (ref 0.2–1)
BUN SERPL-MCNC: 13 MG/DL (ref 5–25)
CALCIUM ALBUM COR SERPL-MCNC: 9.4 MG/DL (ref 8.3–10.1)
CALCIUM SERPL-MCNC: 8.8 MG/DL (ref 8.3–10.1)
CHLORIDE SERPL-SCNC: 107 MMOL/L (ref 100–108)
CO2 SERPL-SCNC: 26 MMOL/L (ref 21–32)
CREAT SERPL-MCNC: 1.06 MG/DL (ref 0.6–1.3)
EOSINOPHIL # BLD AUTO: 0.1 THOUSAND/ΜL (ref 0–0.61)
EOSINOPHIL NFR BLD AUTO: 1 % (ref 0–6)
ERYTHROCYTE [DISTWIDTH] IN BLOOD BY AUTOMATED COUNT: 14 % (ref 11.6–15.1)
GFR SERPL CREATININE-BSD FRML MDRD: 61 ML/MIN/1.73SQ M
GLUCOSE P FAST SERPL-MCNC: 128 MG/DL (ref 65–99)
GLUCOSE SERPL-MCNC: 128 MG/DL (ref 65–140)
HCT VFR BLD AUTO: 48.5 % (ref 34.8–46.1)
HGB BLD-MCNC: 15.7 G/DL (ref 11.5–15.4)
IMM GRANULOCYTES # BLD AUTO: 0.04 THOUSAND/UL (ref 0–0.2)
IMM GRANULOCYTES NFR BLD AUTO: 0 % (ref 0–2)
LACTATE SERPL-SCNC: 1.5 MMOL/L (ref 0.5–2)
LYMPHOCYTES # BLD AUTO: 1.64 THOUSANDS/ΜL (ref 0.6–4.47)
LYMPHOCYTES NFR BLD AUTO: 13 % (ref 14–44)
MCH RBC QN AUTO: 29.5 PG (ref 26.8–34.3)
MCHC RBC AUTO-ENTMCNC: 32.4 G/DL (ref 31.4–37.4)
MCV RBC AUTO: 91 FL (ref 82–98)
MONOCYTES # BLD AUTO: 0.65 THOUSAND/ΜL (ref 0.17–1.22)
MONOCYTES NFR BLD AUTO: 5 % (ref 4–12)
NEUTROPHILS # BLD AUTO: 10.41 THOUSANDS/ΜL (ref 1.85–7.62)
NEUTS SEG NFR BLD AUTO: 80 % (ref 43–75)
NRBC BLD AUTO-RTO: 0 /100 WBCS
PLATELET # BLD AUTO: 247 THOUSANDS/UL (ref 149–390)
PMV BLD AUTO: 9.7 FL (ref 8.9–12.7)
POTASSIUM SERPL-SCNC: 4.4 MMOL/L (ref 3.5–5.3)
PROT SERPL-MCNC: 6.9 G/DL (ref 6.4–8.2)
RBC # BLD AUTO: 5.32 MILLION/UL (ref 3.81–5.12)
SODIUM SERPL-SCNC: 144 MMOL/L (ref 136–145)
WBC # BLD AUTO: 12.9 THOUSAND/UL (ref 4.31–10.16)

## 2021-08-03 PROCEDURE — 85025 COMPLETE CBC W/AUTO DIFF WBC: CPT | Performed by: PHYSICIAN ASSISTANT

## 2021-08-03 PROCEDURE — 99225 PR SBSQ OBSERVATION CARE/DAY 25 MINUTES: CPT | Performed by: SURGERY

## 2021-08-03 PROCEDURE — 83605 ASSAY OF LACTIC ACID: CPT | Performed by: PHYSICIAN ASSISTANT

## 2021-08-03 PROCEDURE — 0DJ08ZZ INSPECTION OF UPPER INTESTINAL TRACT, VIA NATURAL OR ARTIFICIAL OPENING ENDOSCOPIC: ICD-10-PCS | Performed by: SURGERY

## 2021-08-03 PROCEDURE — G0379 DIRECT REFER HOSPITAL OBSERV: HCPCS

## 2021-08-03 PROCEDURE — 80053 COMPREHEN METABOLIC PANEL: CPT | Performed by: PHYSICIAN ASSISTANT

## 2021-08-03 PROCEDURE — 43772 LAP RMVL GASTR ADJ DEVICE: CPT | Performed by: SURGERY

## 2021-08-03 PROCEDURE — 3E1M38Z IRRIGATION OF PERITONEAL CAVITY USING IRRIGATING SUBSTANCE, PERCUTANEOUS APPROACH: ICD-10-PCS | Performed by: SURGERY

## 2021-08-03 PROCEDURE — 0DP04YZ REMOVAL OF OTHER DEVICE FROM UPPER INTESTINAL TRACT, PERCUTANEOUS ENDOSCOPIC APPROACH: ICD-10-PCS | Performed by: SURGERY

## 2021-08-03 PROCEDURE — 96376 TX/PRO/DX INJ SAME DRUG ADON: CPT

## 2021-08-03 RX ORDER — MIDAZOLAM HYDROCHLORIDE 2 MG/2ML
INJECTION, SOLUTION INTRAMUSCULAR; INTRAVENOUS AS NEEDED
Status: DISCONTINUED | OUTPATIENT
Start: 2021-08-03 | End: 2021-08-03

## 2021-08-03 RX ORDER — ONDANSETRON 2 MG/ML
4 INJECTION INTRAMUSCULAR; INTRAVENOUS EVERY 6 HOURS PRN
Status: DISCONTINUED | OUTPATIENT
Start: 2021-08-03 | End: 2021-08-05 | Stop reason: HOSPADM

## 2021-08-03 RX ORDER — ACETAMINOPHEN 325 MG/1
975 TABLET ORAL ONCE
Status: DISCONTINUED | OUTPATIENT
Start: 2021-08-03 | End: 2021-08-03 | Stop reason: HOSPADM

## 2021-08-03 RX ORDER — ONDANSETRON 2 MG/ML
4 INJECTION INTRAMUSCULAR; INTRAVENOUS ONCE
Status: COMPLETED | OUTPATIENT
Start: 2021-08-03 | End: 2021-08-03

## 2021-08-03 RX ORDER — GABAPENTIN 300 MG/1
300 CAPSULE ORAL ONCE
Status: DISCONTINUED | OUTPATIENT
Start: 2021-08-03 | End: 2021-08-03 | Stop reason: HOSPADM

## 2021-08-03 RX ORDER — SODIUM CHLORIDE, SODIUM LACTATE, POTASSIUM CHLORIDE, CALCIUM CHLORIDE 600; 310; 30; 20 MG/100ML; MG/100ML; MG/100ML; MG/100ML
100 INJECTION, SOLUTION INTRAVENOUS CONTINUOUS
Status: DISCONTINUED | OUTPATIENT
Start: 2021-08-03 | End: 2021-08-05 | Stop reason: HOSPADM

## 2021-08-03 RX ORDER — SODIUM CHLORIDE, SODIUM LACTATE, POTASSIUM CHLORIDE, CALCIUM CHLORIDE 600; 310; 30; 20 MG/100ML; MG/100ML; MG/100ML; MG/100ML
100 INJECTION, SOLUTION INTRAVENOUS CONTINUOUS
Status: DISCONTINUED | OUTPATIENT
Start: 2021-08-03 | End: 2021-08-03

## 2021-08-03 RX ORDER — OXYCODONE HCL 5 MG/5 ML
10 SOLUTION, ORAL ORAL EVERY 4 HOURS PRN
Status: DISCONTINUED | OUTPATIENT
Start: 2021-08-03 | End: 2021-08-05 | Stop reason: HOSPADM

## 2021-08-03 RX ORDER — CELECOXIB 200 MG/1
200 CAPSULE ORAL ONCE
Status: DISCONTINUED | OUTPATIENT
Start: 2021-08-03 | End: 2021-08-03 | Stop reason: HOSPADM

## 2021-08-03 RX ORDER — ONDANSETRON 2 MG/ML
4 INJECTION INTRAMUSCULAR; INTRAVENOUS ONCE AS NEEDED
Status: DISCONTINUED | OUTPATIENT
Start: 2021-08-03 | End: 2021-08-03 | Stop reason: HOSPADM

## 2021-08-03 RX ORDER — ROCURONIUM BROMIDE 10 MG/ML
INJECTION, SOLUTION INTRAVENOUS AS NEEDED
Status: DISCONTINUED | OUTPATIENT
Start: 2021-08-03 | End: 2021-08-03

## 2021-08-03 RX ORDER — MORPHINE SULFATE 4 MG/ML
4 INJECTION, SOLUTION INTRAMUSCULAR; INTRAVENOUS EVERY 4 HOURS PRN
Status: DISCONTINUED | OUTPATIENT
Start: 2021-08-03 | End: 2021-08-03

## 2021-08-03 RX ORDER — BUPIVACAINE HYDROCHLORIDE 5 MG/ML
INJECTION, SOLUTION PERINEURAL AS NEEDED
Status: DISCONTINUED | OUTPATIENT
Start: 2021-08-03 | End: 2021-08-03 | Stop reason: HOSPADM

## 2021-08-03 RX ORDER — PROMETHAZINE HYDROCHLORIDE 25 MG/ML
25 INJECTION, SOLUTION INTRAMUSCULAR; INTRAVENOUS EVERY 6 HOURS PRN
Status: DISCONTINUED | OUTPATIENT
Start: 2021-08-03 | End: 2021-08-05 | Stop reason: HOSPADM

## 2021-08-03 RX ORDER — ONDANSETRON 2 MG/ML
INJECTION INTRAMUSCULAR; INTRAVENOUS AS NEEDED
Status: DISCONTINUED | OUTPATIENT
Start: 2021-08-03 | End: 2021-08-03

## 2021-08-03 RX ORDER — MORPHINE SULFATE 10 MG/ML
4 INJECTION, SOLUTION INTRAMUSCULAR; INTRAVENOUS EVERY 4 HOURS PRN
Status: DISCONTINUED | OUTPATIENT
Start: 2021-08-03 | End: 2021-08-04 | Stop reason: SDUPTHER

## 2021-08-03 RX ORDER — HEPARIN SODIUM 5000 [USP'U]/ML
5000 INJECTION, SOLUTION INTRAVENOUS; SUBCUTANEOUS
Status: DISCONTINUED | OUTPATIENT
Start: 2021-08-04 | End: 2021-08-03 | Stop reason: HOSPADM

## 2021-08-03 RX ORDER — SCOLOPAMINE TRANSDERMAL SYSTEM 1 MG/1
1 PATCH, EXTENDED RELEASE TRANSDERMAL ONCE
Status: DISCONTINUED | OUTPATIENT
Start: 2021-08-03 | End: 2021-08-03 | Stop reason: HOSPADM

## 2021-08-03 RX ORDER — HYDROMORPHONE HCL/PF 1 MG/ML
0.5 SYRINGE (ML) INJECTION
Status: DISCONTINUED | OUTPATIENT
Start: 2021-08-03 | End: 2021-08-03 | Stop reason: HOSPADM

## 2021-08-03 RX ORDER — HYDRALAZINE HYDROCHLORIDE 20 MG/ML
10 INJECTION INTRAMUSCULAR; INTRAVENOUS ONCE
Status: COMPLETED | OUTPATIENT
Start: 2021-08-03 | End: 2021-08-03

## 2021-08-03 RX ORDER — SIMETHICONE 80 MG
80 TABLET,CHEWABLE ORAL 4 TIMES DAILY PRN
Status: DISCONTINUED | OUTPATIENT
Start: 2021-08-03 | End: 2021-08-05 | Stop reason: HOSPADM

## 2021-08-03 RX ORDER — SUCCINYLCHOLINE/SOD CL,ISO/PF 100 MG/5ML
SYRINGE (ML) INTRAVENOUS AS NEEDED
Status: DISCONTINUED | OUTPATIENT
Start: 2021-08-03 | End: 2021-08-03

## 2021-08-03 RX ORDER — ACETAMINOPHEN 160 MG/5ML
975 SUSPENSION, ORAL (FINAL DOSE FORM) ORAL EVERY 8 HOURS
Status: DISCONTINUED | OUTPATIENT
Start: 2021-08-03 | End: 2021-08-05 | Stop reason: HOSPADM

## 2021-08-03 RX ORDER — DIPHENHYDRAMINE HCL 25 MG
25 TABLET ORAL EVERY 8 HOURS PRN
Status: DISCONTINUED | OUTPATIENT
Start: 2021-08-03 | End: 2021-08-05 | Stop reason: HOSPADM

## 2021-08-03 RX ORDER — FENTANYL CITRATE 50 UG/ML
INJECTION, SOLUTION INTRAMUSCULAR; INTRAVENOUS AS NEEDED
Status: DISCONTINUED | OUTPATIENT
Start: 2021-08-03 | End: 2021-08-03

## 2021-08-03 RX ORDER — MAGNESIUM HYDROXIDE 1200 MG/15ML
LIQUID ORAL AS NEEDED
Status: DISCONTINUED | OUTPATIENT
Start: 2021-08-03 | End: 2021-08-03 | Stop reason: HOSPADM

## 2021-08-03 RX ORDER — CEFAZOLIN SODIUM 2 G/50ML
2000 SOLUTION INTRAVENOUS ONCE
Status: COMPLETED | OUTPATIENT
Start: 2021-08-03 | End: 2021-08-03

## 2021-08-03 RX ORDER — OXYCODONE HCL 5 MG/5 ML
5 SOLUTION, ORAL ORAL EVERY 4 HOURS PRN
Status: DISCONTINUED | OUTPATIENT
Start: 2021-08-03 | End: 2021-08-05 | Stop reason: HOSPADM

## 2021-08-03 RX ORDER — LIDOCAINE HYDROCHLORIDE 20 MG/ML
INJECTION, SOLUTION EPIDURAL; INFILTRATION; INTRACAUDAL; PERINEURAL AS NEEDED
Status: DISCONTINUED | OUTPATIENT
Start: 2021-08-03 | End: 2021-08-03

## 2021-08-03 RX ORDER — FENTANYL CITRATE/PF 50 MCG/ML
50 SYRINGE (ML) INJECTION
Status: DISCONTINUED | OUTPATIENT
Start: 2021-08-03 | End: 2021-08-03 | Stop reason: HOSPADM

## 2021-08-03 RX ORDER — HYDROCHLOROTHIAZIDE 12.5 MG/1
12.5 TABLET ORAL DAILY
Status: DISCONTINUED | OUTPATIENT
Start: 2021-08-03 | End: 2021-08-03

## 2021-08-03 RX ORDER — METOCLOPRAMIDE HYDROCHLORIDE 5 MG/ML
10 INJECTION INTRAMUSCULAR; INTRAVENOUS EVERY 6 HOURS PRN
Status: DISCONTINUED | OUTPATIENT
Start: 2021-08-03 | End: 2021-08-03

## 2021-08-03 RX ORDER — PROPOFOL 10 MG/ML
INJECTION, EMULSION INTRAVENOUS AS NEEDED
Status: DISCONTINUED | OUTPATIENT
Start: 2021-08-03 | End: 2021-08-03

## 2021-08-03 RX ORDER — ONDANSETRON 2 MG/ML
4 INJECTION INTRAMUSCULAR; INTRAVENOUS EVERY 4 HOURS PRN
Status: DISCONTINUED | OUTPATIENT
Start: 2021-08-03 | End: 2021-08-03

## 2021-08-03 RX ORDER — SODIUM CHLORIDE 9 MG/ML
INJECTION INTRAVENOUS AS NEEDED
Status: DISCONTINUED | OUTPATIENT
Start: 2021-08-03 | End: 2021-08-03 | Stop reason: HOSPADM

## 2021-08-03 RX ORDER — ACETAMINOPHEN 325 MG/1
975 TABLET ORAL EVERY 8 HOURS
Status: DISCONTINUED | OUTPATIENT
Start: 2021-08-03 | End: 2021-08-05 | Stop reason: HOSPADM

## 2021-08-03 RX ORDER — METOCLOPRAMIDE HYDROCHLORIDE 5 MG/ML
10 INJECTION INTRAMUSCULAR; INTRAVENOUS EVERY 6 HOURS PRN
Status: DISCONTINUED | OUTPATIENT
Start: 2021-08-03 | End: 2021-08-05

## 2021-08-03 RX ORDER — HYDROMORPHONE HCL/PF 1 MG/ML
1 SYRINGE (ML) INJECTION EVERY 4 HOURS PRN
Status: DISCONTINUED | OUTPATIENT
Start: 2021-08-03 | End: 2021-08-03

## 2021-08-03 RX ADMIN — MORPHINE SULFATE 4 MG: 4 INJECTION INTRAVENOUS at 13:31

## 2021-08-03 RX ADMIN — SUGAMMADEX 200 MG: 100 INJECTION, SOLUTION INTRAVENOUS at 16:24

## 2021-08-03 RX ADMIN — MORPHINE SULFATE 4 MG: 4 INJECTION INTRAVENOUS at 05:40

## 2021-08-03 RX ADMIN — FENTANYL CITRATE 50 MCG: 50 INJECTION INTRAMUSCULAR; INTRAVENOUS at 15:24

## 2021-08-03 RX ADMIN — ONDANSETRON 4 MG: 2 INJECTION INTRAMUSCULAR; INTRAVENOUS at 05:54

## 2021-08-03 RX ADMIN — HYDRALAZINE HYDROCHLORIDE 10 MG: 20 INJECTION, SOLUTION INTRAMUSCULAR; INTRAVENOUS at 17:09

## 2021-08-03 RX ADMIN — SODIUM CHLORIDE, SODIUM LACTATE, POTASSIUM CHLORIDE, AND CALCIUM CHLORIDE 100 ML/HR: .6; .31; .03; .02 INJECTION, SOLUTION INTRAVENOUS at 17:38

## 2021-08-03 RX ADMIN — ROCURONIUM BROMIDE 5 MG: 50 INJECTION, SOLUTION INTRAVENOUS at 15:06

## 2021-08-03 RX ADMIN — ROCURONIUM BROMIDE 20 MG: 50 INJECTION, SOLUTION INTRAVENOUS at 15:20

## 2021-08-03 RX ADMIN — MIDAZOLAM 2 MG: 1 INJECTION INTRAMUSCULAR; INTRAVENOUS at 14:55

## 2021-08-03 RX ADMIN — LIDOCAINE HYDROCHLORIDE 100 MG: 20 INJECTION, SOLUTION EPIDURAL; INFILTRATION; INTRACAUDAL; PERINEURAL at 15:06

## 2021-08-03 RX ADMIN — FENTANYL CITRATE 50 MCG: 50 INJECTION INTRAMUSCULAR; INTRAVENOUS at 15:30

## 2021-08-03 RX ADMIN — SODIUM CHLORIDE, SODIUM LACTATE, POTASSIUM CHLORIDE, AND CALCIUM CHLORIDE 100 ML/HR: .6; .31; .03; .02 INJECTION, SOLUTION INTRAVENOUS at 05:40

## 2021-08-03 RX ADMIN — ONDANSETRON 4 MG: 2 INJECTION INTRAMUSCULAR; INTRAVENOUS at 15:53

## 2021-08-03 RX ADMIN — ROCURONIUM BROMIDE 25 MG: 50 INJECTION, SOLUTION INTRAVENOUS at 15:10

## 2021-08-03 RX ADMIN — SODIUM CHLORIDE, SODIUM LACTATE, POTASSIUM CHLORIDE, AND CALCIUM CHLORIDE: .6; .31; .03; .02 INJECTION, SOLUTION INTRAVENOUS at 15:37

## 2021-08-03 RX ADMIN — CEFAZOLIN SODIUM 2000 MG: 2 SOLUTION INTRAVENOUS at 14:49

## 2021-08-03 RX ADMIN — FAMOTIDINE 20 MG: 10 INJECTION INTRAVENOUS at 22:32

## 2021-08-03 RX ADMIN — ONDANSETRON 4 MG: 2 INJECTION INTRAMUSCULAR; INTRAVENOUS at 03:21

## 2021-08-03 RX ADMIN — ONDANSETRON 4 MG: 2 INJECTION INTRAMUSCULAR; INTRAVENOUS at 18:16

## 2021-08-03 RX ADMIN — METOCLOPRAMIDE 10 MG: 5 INJECTION, SOLUTION INTRAMUSCULAR; INTRAVENOUS at 08:20

## 2021-08-03 RX ADMIN — Medication 100 MG: at 15:06

## 2021-08-03 RX ADMIN — PROPOFOL 200 MG: 10 INJECTION, EMULSION INTRAVENOUS at 15:06

## 2021-08-03 RX ADMIN — FENTANYL CITRATE 100 MCG: 50 INJECTION INTRAMUSCULAR; INTRAVENOUS at 15:06

## 2021-08-03 NOTE — PROGRESS NOTES
Progress Note - Bariatric Surgery   Nuno Dang 46 y o  female MRN: [id number]  Unit/Bed#: E5 -01 Encounter: [id number]      Subjective/Objective     Subjective:  Patient with morbid obesity complaining of periumbilical abdominal pain with high grade small bowel obstruction with ascites and edema on CT, stable  Patient transfered [facility] for overnight observation and Diagnostic Laparoscopy  Admitted under Dr oLri Orr  Abdominal pain improved since onset and located around the periumbilical region  Patient denies fevers, chills, sweats, SOB, CP, calf pain  Patient reports some nausea but no vomiting today  She is currently NPO awaiting Diagnostic Laparoscopy today with Dr Kirsten Coronado  Vital signs stable without tachycardia this morning  Patient afebrile  Mild HTN  CBC today shows Hgb elevated at 15 7 decreased from 17 2 previously with WBC improved to 12 90 from 15 80 yesterday  BMP pending  NGT in place with low intermittent suction  Objective:    /93   Pulse 95   Temp 97 9 °F (36 6 °C) (Oral)   Resp 21   Ht 5' 7" (1 702 m)   SpO2 96%   BMI 42 29 kg/m²     No intake or output data in the 24 hours ending 08/03/21 0814    Invasive Devices     Peripheral Intravenous Line            Peripheral IV 08/02/21 Left Antecubital <1 day          Drain            NG/OG/Enteral Tube Nasogastric 16 Fr Left nares <1 day                ROS: 10-point system completed  All negative except see HPI      Physical Exam    General Appearance:    Alert, cooperative, no distress, appears stated age   Head:    Normocephalic, without obvious abnormality, atraumatic   Lungs:     Respirations unlabored   Heart:    Regular rate and rhythm   Abdomen:     Soft, no peritoneal signs, mild tenderness to palpation over periumbilical region, no masses, no organomegaly, non-distended, port palpable in LUQ, no rebound or guarding   Extremities:   Extremities normal, atraumatic, no cyanosis or edema   Neurologic:  Incision:  Psych:   Normal strength and sensation    N/A    Normal mood and affect       Lab, Imaging and other studies:  I have personally reviewed pertinent lab results  , CBC:   Lab Results   Component Value Date    WBC 12 90 (H) 08/03/2021    HGB 15 7 (H) 08/03/2021    HCT 48 5 (H) 08/03/2021    MCV 91 08/03/2021     08/03/2021    MCH 29 5 08/03/2021    MCHC 32 4 08/03/2021    RDW 14 0 08/03/2021    MPV 9 7 08/03/2021    NRBC 0 08/03/2021   , CMP:   Lab Results   Component Value Date    SODIUM 136 08/02/2021    K 3 8 08/02/2021     08/02/2021    CO2 24 08/02/2021    BUN 11 08/02/2021    CREATININE 0 79 08/02/2021    CALCIUM 9 5 08/02/2021    AST 11 (L) 08/02/2021    ALT 10 08/02/2021    ALKPHOS 63 08/02/2021    EGFR 87 08/02/2021      CT ABDOMEN AND PELVIS WITH IV CONTRAST     INDICATION:   Epigastric/left upper quadrant abdominal pain with nausea vomiting      COMPARISON:  3/22/2021     TECHNIQUE:  CT examination of the abdomen and pelvis was performed  Axial, sagittal, and coronal 2D reformatted images were created from the source data and submitted for interpretation      Radiation dose length product (DLP) for this visit:  1418 6 mGy-cm   This examination, like all CT scans performed in the [facility], was performed utilizing techniques to minimize radiation dose exposure, including the use of iterative   reconstruction and automated exposure control      IV Contrast:  100 mL of iohexol (OMNIPAQUE)  Enteric Contrast:  Enteric contrast was not administered      FINDINGS:     ABDOMEN     LOWER CHEST:  No clinically significant abnormality identified in the visualized lower chest      LIVER/BILIARY TREE:  Liver is diffusely decreased in density consistent with fatty change  No CT evidence of suspicious hepatic mass  Normal hepatic contours    No biliary dilatation      GALLBLADDER:  Gallbladder is surgically absent      SPLEEN: Unremarkable      PANCREAS:  Unremarkable      ADRENAL GLANDS:  There is low density lobulated thickening of adrenal glands bilaterally statistically most likely to represent adenomatous hyperplasia      KIDNEYS/URETERS:  Unremarkable  No hydronephrosis      STOMACH AND BOWEL:  There is colonic diverticulosis without evidence of acute diverticulitis      Patient is post gastric lap band  There is a high-grade small bowel obstruction secondary to the small bowel looking around the gastric lap band catheter in the mid abdomen      APPENDIX:  A normal appendix was visualized      ABDOMINOPELVIC CAVITY:  Moderate ascites  No pneumoperitoneum  No lymphadenopathy      VESSELS:  Unremarkable for patient's age      PELVIS     REPRODUCTIVE ORGANS:  Unremarkable for patient's age      URINARY BLADDER:  Unremarkable      ABDOMINAL WALL/INGUINAL REGIONS:  Unremarkable      OSSEOUS STRUCTURES:  No acute fracture or destructive osseous lesion      IMPRESSION:  Patient is post gastric lap band  There is a high-grade small bowel obstruction secondary to the small bowel looking around the gastric lap band catheter in the mid abdomen  Associated mesenteric edema and ascites are noted      I personally discussed this study with Arthur Arora on 8/2/2021 at 9:24 PM      Workstation performed: [id number]      VTE Mechanical Prophylaxis: sequential compression device      Assessment/Plan  1) Patient stable and in no acute distress  Nonperitoneal abdomen with no rebound or guarding  Emergent surgical intervention still not necessary at this time  Plan for Diag Lap this afternoon with Dr Meri Álvarez saw and examined patient this morning       - Lactic acid level STAT to access status, normal yesterday   - Continue IV LR hydration  - Keep patient NPO  - Pain control PRN  - Nausea control PRN, added Reglan  - will obtain consent for Diagnostic Laparoscopy from patient   - Plan for Diag Lap this afternoon unless patient condition changes  - Monitor HTN, will consult SLIM postoperatively    Plan of care was discussed with patient  Care plan discussed with Dr Amalia Mckinney PA-C  Bariatric Surgery  8/3/2021  8:14 AM

## 2021-08-03 NOTE — H&P
Consultation - Bariatric Surgery   Ivana Crane 46 y o  female MRN: [id number]  Unit/Bed#: ED 04 Encounter: [id number]    Assessment/Plan     Assessment:  Periumbilical abdominal pain with high grade small bowel obstruction with ascites and edema on CT, stable  Patient laying comfortably on bed in no acute distress  Abdomen nonperitoneal with mild RUQ and periumbilical pain to deep palpation  Lipase low at 10  Lactic Acid WNL  Leukocytosis at 15 80  Total bilirubin increased 1 2  Vitals show tachycardia and HTN at 162/96    Plan:  Patient stable and in no acute distress  Nonperitoneal abdomen with no rebound or guarding  Emergent surgical intervention not necessary at this time  Will transfer patient level 2 to [facility] for overnight observation and likely Diagnostic Laparoscopy tomorrow  Admit under Dr Buddy Hood service med surg      - IV LR hydration  - Keep patient NPO  - Pain control PRN    History of Present Illness     HPI:  Ivana Crane is a 46 y o  female Body mass index is 42 29 kg/m²  with history of Gastric Band placement 8 years ago with Dr Joanell Hatchet who presents to the [facility] ED with complaints of periumbilical pain associated vomiting and intolerance to PO intake  Patient notes having some issues with band including intermittent abdominal pain for the past few months for which a previous CT was obtained on 3/22 and showed status post gastric lap band placement with access port is twisted in the left upper quadrant subcutaneous soft tissues with increased fluid in the pocket surrounding the port  Since then she has tried to get established with our service and had records transferred but has not yet been seen in the office  At 1:30AM this morning she developed severe periumbilical pain and laid in bed until she came into ED because she was unable to tolerate anything by mouth all day  CT obtained today shows "Patient is post gastric lap band   There is a high-grade small bowel obstruction secondary to the small bowel looking around the gastric lap band catheter in the mid abdomen  Associated mesenteric edema and ascites are noted " She denies smoking, alcohol use, and admits to minimal caffeine intake  She admits to using NSAIDs on occasion  Currently has NGT in place  Last bowel movement was on Sunday this week             Consults    Review of Systems   Constitutional: Negative for chills and fever  HENT: Negative for ear pain and sore throat  Eyes: Negative for pain and visual disturbance  Respiratory: Negative for cough and shortness of breath  Cardiovascular: Negative for chest pain and palpitations  Gastrointestinal: Positive for abdominal pain and vomiting  Genitourinary: Negative for dysuria and hematuria  Musculoskeletal: Negative for arthralgias and back pain  Skin: Negative for color change and rash  Neurological: Negative for seizures and syncope  All other systems reviewed and are negative  Historical Information   History reviewed  No pertinent past medical history    Past Surgical History:   Procedure Laterality Date    CHOLECYSTECTOMY      HYSTERECTOMY      LAPAROSCOPIC GASTRIC BANDING       Social History   Social History     Substance and Sexual Activity   Alcohol Use Never     Social History     Substance and Sexual Activity   Drug Use Never     Social History     Tobacco Use   Smoking Status Never Smoker   Smokeless Tobacco Never Used     Family History: non-contributory    Meds/Allergies   all current active meds have been reviewed  No Known Allergies    Objective   First Vitals:   Blood Pressure: 162/96 (08/02/21 1903)  Pulse: (!) 110 (08/02/21 1903)  Temperature: 97 8 °F (36 6 °C) (08/02/21 1903)  Temp Source: Oral (08/02/21 1903)  Respirations: 18 (08/02/21 1903)  Weight - Scale: 122 kg (270 lb) (08/02/21 1903)  SpO2: 97 % (08/02/21 1903)    Current Vitals:   Blood Pressure: 162/96 (08/02/21 1903)  Pulse: (!) 110 (08/02/21 1903)  Temperature: 97 8 °F (36 6 °C) (08/02/21 1903)  Temp Source: Oral (08/02/21 1903)  Respirations: 18 (08/02/21 1903)  Weight - Scale: 122 kg (270 lb) (08/02/21 1903)  SpO2: 97 % (08/02/21 1903)    No intake or output data in the 24 hours ending 08/03/21 0000    Invasive Devices     Peripheral Intravenous Line            Peripheral IV 08/02/21 Left Antecubital <1 day          Drain            NG/OG/Enteral Tube Nasogastric 16 Fr Left nares <1 day                Physical Exam  Constitutional:       Appearance: Normal appearance  She is obese  HENT:      Head: Normocephalic and atraumatic  Nose: Nose normal       Mouth/Throat:      Mouth: Mucous membranes are moist    Eyes:      Extraocular Movements: Extraocular movements intact  Pupils: Pupils are equal, round, and reactive to light  Cardiovascular:      Rate and Rhythm: Normal rate and regular rhythm  Pulses: Normal pulses  Heart sounds: Normal heart sounds  Pulmonary:      Effort: Pulmonary effort is normal       Breath sounds: Normal breath sounds  Abdominal:      Palpations: Abdomen is soft  Tenderness: There is abdominal tenderness  There is no guarding or rebound  Comments: Obese, port palpable in LUQ, nonperitoneal, mild tenderness to deep palpation in RUQ and periumbilical region   Musculoskeletal:      Cervical back: Normal range of motion  Skin:     General: Skin is warm  Neurological:      General: No focal deficit present  Mental Status: She is alert and oriented to person, place, and time  Psychiatric:         Mood and Affect: Mood normal          Behavior: Behavior normal          Lab Results:   I have personally reviewed pertinent lab results    , CBC:   Lab Results   Component Value Date    WBC 15 80 (H) 08/02/2021    HGB 17 2 (H) 08/02/2021    HCT 51 6 (H) 08/02/2021    MCV 88 08/02/2021     08/02/2021    MCH 29 4 08/02/2021    MCHC 33 4 08/02/2021    RDW 14 3 08/02/2021 MPV 8 0 (L) 08/02/2021   , CMP:   Lab Results   Component Value Date    SODIUM 136 08/02/2021    K 3 8 08/02/2021     08/02/2021    CO2 24 08/02/2021    BUN 11 08/02/2021    CREATININE 0 79 08/02/2021    CALCIUM 9 5 08/02/2021    AST 11 (L) 08/02/2021    ALT 10 08/02/2021    ALKPHOS 63 08/02/2021    EGFR 87 08/02/2021   , Urinalysis:   Lab Results   Component Value Date    COLORU Yellow 08/02/2021    CLARITYU Clear 08/02/2021    SPECGRAV <=1 005 (L) 08/02/2021    PHUR 6 5 08/02/2021    LEUKOCYTESUR Negative 08/02/2021    NITRITE Negative 08/02/2021    GLUCOSEU Negative 08/02/2021    KETONESU Trace (A) 08/02/2021    BILIRUBINUR Negative 08/02/2021    BLOODU Trace-Intact (A) 08/02/2021   , Lipase:   Lab Results   Component Value Date    LIPASE <10 (L) 08/02/2021     Imaging: I have personally reviewed pertinent reports  EKG, Pathology, and Other Studies: I have personally reviewed pertinent reports  CT ABDOMEN AND PELVIS WITH IV CONTRAST     INDICATION:   Epigastric/left upper quadrant abdominal pain with nausea vomiting      COMPARISON:  3/22/2021     TECHNIQUE:  CT examination of the abdomen and pelvis was performed  Axial, sagittal, and coronal 2D reformatted images were created from the source data and submitted for interpretation      Radiation dose length product (DLP) for this visit:  1418 6 mGy-cm   This examination, like all CT scans performed in the [facility], was performed utilizing techniques to minimize radiation dose exposure, including the use of iterative   reconstruction and automated exposure control      IV Contrast:  100 mL of iohexol (OMNIPAQUE)  Enteric Contrast:  Enteric contrast was not administered      FINDINGS:     ABDOMEN     LOWER CHEST:  No clinically significant abnormality identified in the visualized lower chest      LIVER/BILIARY TREE:  Liver is diffusely decreased in density consistent with fatty change  No CT evidence of suspicious hepatic mass  Normal hepatic contours  No biliary dilatation      GALLBLADDER:  Gallbladder is surgically absent      SPLEEN:  Unremarkable      PANCREAS:  Unremarkable      ADRENAL GLANDS:  There is low density lobulated thickening of adrenal glands bilaterally statistically most likely to represent adenomatous hyperplasia      KIDNEYS/URETERS:  Unremarkable  No hydronephrosis      STOMACH AND BOWEL:  There is colonic diverticulosis without evidence of acute diverticulitis      Patient is post gastric lap band  There is a high-grade small bowel obstruction secondary to the small bowel looking around the gastric lap band catheter in the mid abdomen      APPENDIX:  A normal appendix was visualized      ABDOMINOPELVIC CAVITY:  Moderate ascites  No pneumoperitoneum  No lymphadenopathy      VESSELS:  Unremarkable for patient's age      PELVIS     REPRODUCTIVE ORGANS:  Unremarkable for patient's age      URINARY BLADDER:  Unremarkable      ABDOMINAL WALL/INGUINAL REGIONS:  Unremarkable      OSSEOUS STRUCTURES:  No acute fracture or destructive osseous lesion      IMPRESSION:  Patient is post gastric lap band  There is a high-grade small bowel obstruction secondary to the small bowel looking around the gastric lap band catheter in the mid abdomen  Associated mesenteric edema and ascites are noted      I personally discussed this study with Breanna Arevalo on 8/2/2021 at 9:24 PM      Workstation performed: [id number]        Counseling / Coordination of Care  Total floor / unit time spent today 30 minutes  Greater than 50% of total time was spent with the patient and / or family counseling and / or coordination of care

## 2021-08-03 NOTE — PLAN OF CARE
Problem: Nutrition/Hydration-ADULT  Goal: Nutrient/Hydration intake appropriate for improving, restoring or maintaining nutritional needs  Description: Monitor and assess patient's nutrition/hydration status for malnutrition  Collaborate with interdisciplinary team and initiate plan and interventions as ordered  Monitor patient's weight and dietary intake as ordered or per policy  Utilize nutrition screening tool and intervene as necessary  Determine patient's food preferences and provide high-protein, high-caloric foods as appropriate       INTERVENTIONS:  - Monitor oral intake, urinary output, labs, and treatment plans  - Assess nutrition and hydration status and recommend course of action  - Evaluate amount of meals eaten  - Assist patient with eating if necessary   - Allow adequate time for meals  - Recommend/ encourage appropriate diets, oral nutritional supplements, and vitamin/mineral supplements  - Order, calculate, and assess calorie counts as needed  - Recommend, monitor, and adjust tube feedings and TPN/PPN based on assessed needs  - Assess need for intravenous fluids  - Provide specific nutrition/hydration education as appropriate  - Include patient/family/caregiver in decisions related to nutrition  Outcome: Progressing     Problem: PAIN - ADULT  Goal: Verbalizes/displays adequate comfort level or baseline comfort level  Description: Interventions:  - Encourage patient to monitor pain and request assistance  - Assess pain using appropriate pain scale  - Administer analgesics based on type and severity of pain and evaluate response  - Implement non-pharmacological measures as appropriate and evaluate response  - Consider cultural and social influences on pain and pain management  - Notify physician/advanced practitioner if interventions unsuccessful or patient reports new pain  Outcome: Progressing     Problem: INFECTION - ADULT  Goal: Absence or prevention of progression during hospitalization  Description: INTERVENTIONS:  - Assess and monitor for signs and symptoms of infection  - Monitor lab/diagnostic results  - Monitor all insertion sites, i e  indwelling lines, tubes, and drains  - Monitor endotracheal if appropriate and nasal secretions for changes in amount and color  - [place] appropriate cooling/warming therapies per order  - Administer medications as ordered  - Instruct and encourage patient and family to use good hand hygiene technique  - Identify and instruct in appropriate isolation precautions for identified infection/condition  Outcome: Progressing  Goal: Absence of fever/infection during neutropenic period  Description: INTERVENTIONS:  - Monitor WBC    Outcome: Progressing     Problem: SAFETY ADULT  Goal: Patient will remain free of falls  Description: INTERVENTIONS:  - Educate patient/family on patient safety including physical limitations  - Instruct patient to call for assistance with activity   - Consult OT/PT to assist with strengthening/mobility   - Keep Call bell within reach  - Keep bed low and locked with side rails adjusted as appropriate  - Keep care items and personal belongings within reach  - Initiate and maintain comfort rounds  - Make Fall Risk Sign visible to staff  - Apply yellow socks and bracelet for high fall risk patients  - Consider moving patient to room near nurses station  Outcome: Progressing  Goal: Maintain or return to baseline ADL function  Description: INTERVENTIONS:  -  Assess patient's ability to carry out ADLs; assess patient's baseline for ADL function and identify physical deficits which impact ability to perform ADLs (bathing, care of mouth/teeth, toileting, grooming, dressing, etc )  - Assess/evaluate cause of self-care deficits   - Assess range of motion  - Assess patient's mobility; develop plan if impaired  - Assess patient's need for assistive devices and provide as appropriate  - Encourage maximum independence but intervene and supervise when necessary  - Involve family in performance of ADLs  - Assess for home care needs following discharge   - Consider OT consult to assist with ADL evaluation and planning for discharge  - Provide patient education as appropriate  Outcome: Progressing  Goal: Maintains/Returns to pre admission functional level  Description: INTERVENTIONS:  - Perform BMAT or MOVE assessment daily    - Set and communicate daily mobility goal to care team and patient/family/caregiver  - Collaborate with rehabilitation services on mobility goals if consulted  - Record patient progress and toleration of activity level   Outcome: Progressing     Problem: DISCHARGE PLANNING  Goal: Discharge to home or other facility with appropriate resources  Description: INTERVENTIONS:  - Identify barriers to discharge w/patient and caregiver  - Arrange for needed discharge resources and transportation as appropriate  - Identify discharge learning needs (meds, wound care, etc )  - Arrange for interpretive services to assist at discharge as needed  - Refer to Case Management Department for coordinating discharge planning if the patient needs post-hospital services based on physician/advanced practitioner order or complex needs related to functional status, cognitive ability, or social support system  Outcome: Progressing     Problem: Knowledge Deficit  Goal: Patient/family/caregiver demonstrates understanding of disease process, treatment plan, medications, and discharge instructions  Description: Complete learning assessment and assess knowledge base    Interventions:  - Provide teaching at level of understanding  - Provide teaching via preferred learning methods  Outcome: Progressing

## 2021-08-03 NOTE — CONSULTS
Consultation - General Surgery   Harjeet Rajan 46 y o  female MRN: [id number]  Unit/Bed#: ED 04 Encounter: [id number]    Assessment/Plan     51F s/p multiple prior surgeries including lap brandt, vaginal hysterectomy, lap band and lap band revision 8 years ago, now with high grade small bowel obstruction secondary to small bowel looping around the lap band catheter  Associated mesenteric edema and ascites  No evidence of bowel ischemia or perforation  NGT placed  Labs with hemoconcentration and leukocytosis, lactate normal  IVF resuscitation ongoing  Discussed case with radiology and with bariatric surgery to coordinate transfer to [address] [place] [address] for bariatric surgical evaluation and likely intervention  Will require diagnostic laparoscopy, possible laparotomy  She is overall nontoxic and clinically stable  She states she has improved since presenting  She is in agreement with the plan for transfer and understands that she is likely to require an operation  History of Present Illness     HPI:  Harjeet Rajan is a 46 y o  female who presents with periumbilical and left sided upper abdominal pain since 1am when woken from sleep  She had 2-3 episodes of vomiting  Last BM and flatus Sunday  No BM or flatus since symptoms started at 1am  No prior bowel obstructions  Did have lap band placed 8 years ago and revised shortly after that and has not followed up since  She has been working on establishing care with [facility] but has not yet been seen by them    She has had some intermittent episodic abdominal pain over the past year and had a CT scan in 3/2021 that showed some twisting of the band at that time  Today's workup significant for hemoconcentration, leukocytosis, mild tachycardia in the low 100's, normal lactate  CT scan showing a high grade bowel obstruction with the bowel looped around the lap band catheter  Signs of mesenteric edema nd ascites are concerning  No overt ischemic bowel or perforation   Has not had prior colonoscopy, says stool testing has been normal      Consults    Review of Systems   Constitutional: Positive for appetite change  Gastrointestinal: Positive for abdominal distention, abdominal pain and constipation  Obstipation   All other systems reviewed and are negative  Historical Information   History reviewed  No pertinent past medical history  Past Surgical History:   Procedure Laterality Date    CHOLECYSTECTOMY      HYSTERECTOMY      LAPAROSCOPIC GASTRIC BANDING       Social History   Social History     Substance and Sexual Activity   Alcohol Use Never     Social History     Substance and Sexual Activity   Drug Use Never     Social History     Tobacco Use   Smoking Status Never Smoker   Smokeless Tobacco Never Used     Family History: non-contributory    Meds/Allergies   all current active meds have been reviewed  No Known Allergies    Objective   First Vitals:   Blood Pressure: 162/96 (08/02/21 1903)  Pulse: (!) 110 (08/02/21 1903)  Temperature: 97 8 °F (36 6 °C) (08/02/21 1903)  Temp Source: Oral (08/02/21 1903)  Respirations: 18 (08/02/21 1903)  Weight - Scale: 122 kg (270 lb) (08/02/21 1903)  SpO2: 97 % (08/02/21 1903)    Current Vitals:   Blood Pressure: 162/96 (08/02/21 1903)  Pulse: (!) 110 (08/02/21 1903)  Temperature: 97 8 °F (36 6 °C) (08/02/21 1903)  Temp Source: Oral (08/02/21 1903)  Respirations: 18 (08/02/21 1903)  Weight - Scale: 122 kg (270 lb) (08/02/21 1903)  SpO2: 97 % (08/02/21 1903)    No intake or output data in the 24 hours ending 08/03/21 0033    Invasive Devices     Peripheral Intravenous Line            Peripheral IV 08/02/21 Left Antecubital <1 day          Drain            NG/OG/Enteral Tube Nasogastric 16 Fr Left nares <1 day                Physical Exam  Vitals and nursing note reviewed  Constitutional:       General: She is not in acute distress  Appearance: She is obese  She is not toxic-appearing     HENT:      Mouth/Throat:      Mouth: Mucous membranes are dry  Cardiovascular:      Rate and Rhythm: Tachycardia present  Pulmonary:      Effort: Pulmonary effort is normal  No respiratory distress  Abdominal:      Comments: Abdomen is soft and mildly distended throughout, tender to palpation periumbilical region and mid abdomen with some left sided tenderness as well, very mild tenderness, no rebound or guarding, well healed prior port sites, lap band port is palpable   Neurological:      General: No focal deficit present  Mental Status: She is alert  Mental status is at baseline  Psychiatric:         Mood and Affect: Mood normal          Behavior: Behavior normal          Lab Results:   I have personally reviewed pertinent lab results    , CBC:   Lab Results   Component Value Date    WBC 15 80 (H) 08/02/2021    HGB 17 2 (H) 08/02/2021    HCT 51 6 (H) 08/02/2021    MCV 88 08/02/2021     08/02/2021    MCH 29 4 08/02/2021    MCHC 33 4 08/02/2021    RDW 14 3 08/02/2021    MPV 8 0 (L) 08/02/2021   , CMP:   Lab Results   Component Value Date    SODIUM 136 08/02/2021    K 3 8 08/02/2021     08/02/2021    CO2 24 08/02/2021    BUN 11 08/02/2021    CREATININE 0 79 08/02/2021    CALCIUM 9 5 08/02/2021    AST 11 (L) 08/02/2021    ALT 10 08/02/2021    ALKPHOS 63 08/02/2021    EGFR 87 08/02/2021   , Coagulation: No results found for: PT, INR, APTT, Urinalysis:   Lab Results   Component Value Date    COLORU Yellow 08/02/2021    CLARITYU Clear 08/02/2021    SPECGRAV <=1 005 (L) 08/02/2021    PHUR 6 5 08/02/2021    LEUKOCYTESUR Negative 08/02/2021    NITRITE Negative 08/02/2021    GLUCOSEU Negative 08/02/2021    KETONESU Trace (A) 08/02/2021    BILIRUBINUR Negative 08/02/2021    BLOODU Trace-Intact (A) 08/02/2021   , Amylase: No results found for: AMYLASE, Lipase:   Lab Results   Component Value Date    LIPASE <10 (L) 08/02/2021     Imaging: I have personally reviewed pertinent reports   , I have personally reviewed pertinent films in PACS and I have personally reviewed pertinent films in PACS with a Radiologist       Signature:   Charlie Tran MD  Date: 8/3/2021 Time: 12:33 AM

## 2021-08-03 NOTE — OP NOTE
[facility]   [address] [facility], [address]  [phone] (Fax)      Operative Report  LAPAROSCOPY , EGD     Patient Name: Ivana Crane    :  1970  MRN: [id number]  Patient Location: [facility] OR ROOM 07  Surgery Date : 8/3/2021  Surgeons:  Surgeon(s) and Role:     * Rolando Hernandez MD - Primary     * Chelle Garcia DO - Assisting    Diagnosis:    Pre-Op Diagnosis Codes:  Periumbilical abdominal pain [R10 33]  Nausea [R11 0]  Small bowel obstruction    Post-Op Diagnosis Codes:     * Periumbilical abdominal pain [R10 33]     * Nausea [R11 0]     * Small bowel obstruction    Procedure    1  Diagnostic Laparoscopy  2  Band tubing transection  3  Abdominal washout  4  Intraoperative Endoscopy    Specimen(s):  * No specimens in log *    Estimated Blood Loss:    20 mL   NG/OG/Enteral Tube Nasogastric 16 Fr Left nares (Active)   Site Assessment Clean;Dry; Intact 21   Status Suction-low intermittent 21   Drainage Appearance Bile;Green 21   Number of days: 1       Anesthesia Type:     General    Operative Indications:    Periumbilical abdominal pain [R10 33]  Nausea [R11 0]  Small bowel obstruction    Operative Findings:    Partial small-bowel obstruction with dilated loops of small bowel secondary to adjustable gastric band tubing    Complications:     None    Procedure and Technique:    Indications    59-year-old female status post laparoscopic adjustable gastric band placed more than 8 years ago by Dr Rick Carrillo  One year later she underwent a revision of some sort as the patient could not elaborate  She presented to the emergency room in [facility] hospitals complaining of abdominal pain  An abdominal CT scan was obtained and revealed dilated loops of small bowel that were being caused most likely by the tubing of the band    She was clinically stable and she was transferred to the [facility] for an elective diagnostic laparoscopy  Upon seeing her this morning her pain has significantly improved and her labs were normalizing  Lactic acid was normal and her abdominal exam was relatively benign  Procedure and Technique:    The patient was taken to the operating room and placed in a supine position  A dose of IV antibiotic prophylaxis that consisted of Ancef 2g was given  Sequential compression devices were placed on both lower extremities  After satisfactory general anesthesia induction and endotracheal intubation was achieved, the extremities were secured to prevent neurovascular and musculoskeletal injuries as best as possible  Subsequently, the abdominal wall was prepped and draped in a surgical standard sterile fashion  After a timeout was done and the patient was properly identified and the type of procedure was confirmed a supra-umbilical transverse skin incision was made, and the subcutaneous tissues dissected  Access to the peritoneal cavity was gained with a Veress needle technique  Upon obtaining pneumoperitoneum we used a 12 mm Optiview trocar on the right flank  With this device, we were able to visualize the layers of the abdominal wall, and enter the peritoneal cavity under direct visualization  Upon confirming there was no injury to the underlying structures either with the Veress needle or the trocar, under direct laparoscopic visualization, four additional trocars were placed: a 5 mm in the right upper quadrant subcostal position in the anterior axillary line, a 12-mm port was placed in the willy umbilical region, a 12 mm trocar in the left upper quadrant subcostal position in the anterior axillary line and another 12-mm port was placed in the left flank  With the trocars in place, we then proceeded to perform a diagnostic laparoscopy  There was a segment of mid jejunum that was dilated and severely adherent    There was appearance of side-to-side anastomosis as it was not clear if this bowel was operated before for some sort of obstruction or herniation  The tubing of the band was going from the abdominal wall into this conglomerate of dilated loops of jejunum and exiting proximally towards the band in the proximal stomach  There was no evidence of perforation or ischemia but there was a moderate amount of ascites  I transected the band and we pulled it out of this group of dilated loops and it appeared to decompress  Several attempts were made distally and proximally to untangle this group of loops of bowel but it was pretty distended and this was not feasible  We elevated the transverse colon and identified the ligament of Treitz  We ran the bowel about 40 cm distally and was clamped with a Kelsey clamp  I then proceeded to perform an endoscopy confirming that the NG tube was in good position decompressing the stomach  Upon retroflex seen the scope there was no evidence of band erosion and the scope easily went through the opening of the band  We proceeded to irrigate the abdomen with 3 L of normal saline confirming that there was no sulcus extravasation  Upon inspecting this segment of jejunal loops of distended bowel once again it appeared that the caliber was improving and that there was progression of enteric contents distally  At this point not wanting to create enterotomies manipulating the dilated loops of small bowel I decided to close and we will obtain a follow-up CT scan in the morning  She had remained hemodynamically stable and according to the patient she was feeling better with significant improvement of her pain anyway  If we can decompress her we will schedule her to have the band removal and a formal diagnostic laparoscopy once the bowel is of normal caliber  If she fails to improve clinically we will take her to the OR back for a second laparoscopy and possible open exploration  The sponge, needle and instrument count was reported complete        The 74-TY willy umbilical port site was closed with the use of a suture closure device and a 0 absorbable suture  The remaining ports were then also removed under laparoscopic visualization  The skin incisions were all closed with 4-0 absorbable subcuticular suture  The patient tolerated the procedure well, was extubated uneventfully and was transferred to the recovery room in stable condition  I was present for the entire length of the procedure as the attending of record  No qualified resident was available to assist   The presence of an assistant was necessary for camera holding, traction and counter traction and for help with suturing and stapling in addition to performing the intraop-EGD      Patient Disposition:    PACU     Signature: Lester Mansfield MD  Date: August 3, 2021  Time: 4:24 PM

## 2021-08-03 NOTE — ED PROVIDER NOTES
History  Chief Complaint   Patient presents with    Abdominal Pain     pt presents with mid abdominal pain since 0130hrs today along with nausea and vomiting  Patient is a 26-year-old female that presents for evaluation of abdominal pain with nausea and vomiting  Patient says that at 1:30 a m  she was awoke abruptly secondary to epigastric/periumbilical abdominal pain  Patient has had several episodes of nonbloody, nonbilious emesis since that time  Pain is worsening and described as sharp and stabbing  Patient has been unable to handle p o  secondary to her symptoms  She denies chest pain or dyspnea  Patient has a history of a lap band placed about 8 years ago [facility] who is no longer alive  She has not had any bariatric surgery follow-up since then  She denies hematuria or dysuria  She denies diarrhea, melena hematochezia  Patient otherwise has also had history of cholecystectomy and hysterectomy  Pain is exacerbated when she attempts to take p o  Prior to Admission Medications   Prescriptions Last Dose Informant Patient Reported? Taking?    Diclofenac Sodium (VOLTAREN) 1 %   Yes Yes   aspirin (ECOTRIN LOW STRENGTH) 81 mg EC tablet  Self Yes Yes   Sig: Take 81 mg by mouth daily   hydrochlorothiazide (HYDRODIURIL) 12 5 mg tablet  Self Yes Yes   Sig: Take 12 5 mg by mouth daily      Facility-Administered Medications: None       Past Medical History:   Diagnosis Date    Arthritis        Past Surgical History:   Procedure Laterality Date    ABDOMINAL SURGERY      CHOLECYSTECTOMY      HYSTERECTOMY      LAPAROSCOPIC GASTRIC BANDING         Family History   Problem Relation Age of Onset    Diabetes Mother     Hypertension Mother     Hypertension Father     No Known Problems Sister     No Known Problems Maternal Grandmother     No Known Problems Maternal Grandfather     No Known Problems Paternal Grandmother     No Known Problems Paternal Grandfather    Chelsea Cabral No Known Problems Sister     No Known Problems Sister     No Known Problems Sister     No Known Problems Sister     No Known Problems Maternal Aunt     Alcohol abuse Neg Hx     Arthritis Neg Hx     Asthma Neg Hx     Birth defects Neg Hx     COPD Neg Hx     Depression Neg Hx     Cancer Neg Hx     Drug abuse Neg Hx     Early death Neg Hx     Heart disease Neg Hx     Hyperlipidemia Neg Hx     Hearing loss Neg Hx     Kidney disease Neg Hx     Learning disabilities Neg Hx     Mental illness Neg Hx     Mental retardation Neg Hx     Miscarriages / Stillbirths Neg Hx     Stroke Neg Hx     Vision loss Neg Hx      I have reviewed and agree with the history as documented  E-Cigarette/Vaping    E-Cigarette Use Never User      E-Cigarette/Vaping Substances    Nicotine No     THC No     CBD No     Flavoring No     Other No     Unknown No      Social History     Tobacco Use    Smoking status: Never Smoker    Smokeless tobacco: Never Used   Vaping Use    Vaping Use: Never used   Substance Use Topics    Alcohol use: Never    Drug use: Never       Review of Systems   Constitutional: Negative for fever  HENT: Negative for sore throat  Respiratory: Negative for shortness of breath  Cardiovascular: Negative for chest pain  Gastrointestinal: Positive for abdominal pain, nausea and vomiting  Genitourinary: Negative for dysuria  Musculoskeletal: Negative for back pain  Skin: Negative for rash  Neurological: Negative for light-headedness  Psychiatric/Behavioral: Negative for agitation  All other systems reviewed and are negative  Physical Exam  Physical Exam  Vitals reviewed  Constitutional:       General: She is not in acute distress  Appearance: She is well-developed  HENT:      Head: Normocephalic  Eyes:      Pupils: Pupils are equal, round, and reactive to light  Cardiovascular:      Rate and Rhythm: Normal rate and regular rhythm        Heart sounds: Normal heart sounds  Pulmonary:      Effort: Pulmonary effort is normal       Breath sounds: Normal breath sounds  Abdominal:      General: Bowel sounds are normal  There is no distension  Palpations: Abdomen is soft  Tenderness: There is abdominal tenderness  There is no guarding  Comments: Moderate diffuse abdominal tenderness without rebound tenderness or guarding   Musculoskeletal:         General: No tenderness or deformity  Normal range of motion  Cervical back: Normal range of motion and neck supple  Skin:     General: Skin is warm and dry  Capillary Refill: Capillary refill takes less than 2 seconds  Neurological:      Mental Status: She is alert and oriented to person, place, and time  Cranial Nerves: No cranial nerve deficit  Sensory: No sensory deficit  Psychiatric:         Behavior: Behavior normal          Thought Content:  Thought content normal          Judgment: Judgment normal          Vital Signs  ED Triage Vitals   Temperature Pulse Respirations Blood Pressure SpO2   08/02/21 1903 08/02/21 1903 08/02/21 1903 08/02/21 1903 08/02/21 1903   97 8 °F (36 6 °C) (!) 110 18 162/96 97 %      Temp Source Heart Rate Source Patient Position - Orthostatic VS BP Location FiO2 (%)   08/02/21 1903 08/02/21 1903 08/03/21 0057 08/02/21 1903 --   Oral Monitor Lying Left arm       Pain Score       08/02/21 1903       8           Vitals:    08/02/21 1903 08/03/21 0057   BP: 162/96 117/75   Pulse: (!) 110 85   Patient Position - Orthostatic VS:  Lying         Visual Acuity      ED Medications  Medications   sodium chloride 0 9 % bolus 1,000 mL (0 mL Intravenous Stopped 8/2/21 2201)   ondansetron (ZOFRAN) injection 4 mg (4 mg Intravenous Given 8/2/21 1949)   ketorolac (TORADOL) injection 15 mg (15 mg Intravenous Given 8/2/21 1949)   iohexol (OMNIPAQUE) 350 MG/ML injection (MULTI-DOSE) 100 mL (100 mL Intravenous Given 8/2/21 2047)   HYDROmorphone (DILAUDID) injection 1 mg (1 mg Intravenous Given 8/2/21 2059)   metoclopramide (REGLAN) injection 10 mg (10 mg Intravenous Given 8/2/21 2059)   ondansetron (ZOFRAN) injection 4 mg (4 mg Intravenous Given 8/3/21 0321)       Diagnostic Studies  Results Reviewed     Procedure Component Value Units Date/Time    Lactic acid, plasma [[id number]]  (Normal) Collected: 08/02/21 2306    Lab Status: Final result Specimen: Blood from Hand, Right Updated: 08/02/21 2323     LACTIC ACID 1 4 mmol/L     Narrative:      Result may be elevated if tourniquet was used during collection      Urine Microscopic [[id number]]  (Abnormal) Collected: 08/02/21 2126    Lab Status: Final result Specimen: Urine, Clean Catch Updated: 08/02/21 2137     RBC, UA 0-1 /hpf      WBC, UA 4-10 /hpf      Epithelial Cells Occasional /hpf      Bacteria, UA None Seen /hpf     UA w Reflex to Microscopic w Reflex to Culture [[id number]]  (Abnormal) Collected: 08/02/21 2126    Lab Status: Final result Specimen: Urine, Clean Catch Updated: 08/02/21 2136     Color, UA Yellow     Clarity, UA Clear     Specific Gravity, UA <=1 005     pH, UA 6 5     Leukocytes, UA Negative     Nitrite, UA Negative     Protein, UA Negative mg/dl      Glucose, UA Negative mg/dl      Ketones, UA Trace mg/dl      Urobilinogen, UA 0 2 E U /dl      Bilirubin, UA Negative     Blood, UA Trace-Intact    Lipase [[id number]]  (Abnormal) Collected: 08/02/21 1950    Lab Status: Final result Specimen: Blood from Arm, Left Updated: 08/02/21 2013     Lipase <10 u/L     CMP [[id number]]  (Abnormal) Collected: 08/02/21 1950    Lab Status: Final result Specimen: Blood from Arm, Left Updated: 08/02/21 2013     Sodium 136 mmol/L      Potassium 3 8 mmol/L      Chloride 102 mmol/L      CO2 24 mmol/L      ANION GAP 10 mmol/L      BUN 11 mg/dL      Creatinine 0 79 mg/dL      Glucose 153 mg/dL      Calcium 9 5 mg/dL      AST 11 U/L      ALT 10 U/L      Alkaline Phosphatase 63 U/L      Total Protein 7 3 g/dL      Albumin 4 1 g/dL      Total Bilirubin 1 20 mg/dL      eGFR 87 ml/min/1 73sq m     Narrative:      [place] guidelines for Chronic Kidney Disease (CKD):     Stage 1 with normal or high GFR (GFR > 90 mL/min/1 73 square meters)    Stage 2 Mild CKD (GFR = 60-89 mL/min/1 73 square meters)    Stage 3A Moderate CKD (GFR = 45-59 mL/min/1 73 square meters)    Stage 3B Moderate CKD (GFR = 30-44 mL/min/1 73 square meters)    Stage 4 Severe CKD (GFR = 15-29 mL/min/1 73 square meters)    Stage 5 End Stage CKD (GFR <15 mL/min/1 73 square meters)  Note: GFR calculation is accurate only with a steady state creatinine    Manual Differential (Non Wam) [[id number]]  (Abnormal) Collected: 08/02/21 1950    Lab Status: Final result Specimen: Blood from Arm, Left Updated: 08/02/21 2011     Segmented % 91 %      Lymphocytes % 9 %      Neutrophils Absolute 14 38 Thousand/uL      Lymphocytes Absolute 1 42 Thousand/uL      Total Counted 100     RBC Morphology Normal     Platelet Estimate Adequate    CBC and differential [[id number]]  (Abnormal) Collected: 08/02/21 1950    Lab Status: Final result Specimen: Blood from Arm, Left Updated: 08/02/21 1959     WBC 15 80 Thousand/uL      RBC 5 87 Million/uL      Hemoglobin 17 2 g/dL      Hematocrit 51 6 %      MCV 88 fL      MCH 29 4 pg      MCHC 33 4 g/dL      RDW 14 3 %      MPV 8 0 fL      Platelets 148 Thousands/uL                  CT abdomen pelvis with contrast   Final Result by Yeni Whyte MD (08/02 2125)   Patient is post gastric lap band  There is a high-grade small bowel obstruction secondary to the small bowel looking around the gastric lap band catheter in the mid abdomen  Associated mesenteric edema and ascites are noted               I personally discussed this study with Kodi Tsang on 8/2/2021 at 9:24 PM                      Workstation performed: [id number]         XR chest 1 view portable    (Results Pending)              Procedures  Procedures         ED Course MDM  Number of Diagnoses or Management Options  Nausea and vomiting  SBO (small bowel obstruction) (HCC)  Diagnosis management comments: Patient is a 28-year-old female who presents for evaluation abdominal pain found to have small-bowel obstruction secondary to lap band catheter  Patient to be discussed with surgery here and determine need for possible transfer  Patient hemodynamically stable, blood work unremarkable other than leukocytosis        Disposition  Final diagnoses:   SBO (small bowel obstruction) (HCC)   Nausea and vomiting     Time reflects when diagnosis was documented in both MDM as applicable and the Disposition within this note     Time User Action Codes Description Comment    8/2/2021  9:45 PM Ami Ramirez Add [K56 609] SBO (small bowel obstruction) ([facility] [address] )     8/2/2021  9:45 PM Jaret Clark [R11 2] Nausea and vomiting       ED Disposition     ED Disposition Condition Date/Time Comment    Transfer to Another [address] Aug 3, 2021  2:13 AM Transfer to [facility]       MD Documentation      Most Recent Value   Patient Condition  The patient has been stabilized such that within reasonable medical probability, no material deterioration of the patient condition or the condition of the unborn child(eddie) is likely to result from the transfer   Reason for Transfer  Level of Care needed not available at this facility   Benefits of Transfer  Specialized equipment and/or services available at the receiving facility (Include comment)________________________   Risks of Transfer  Potential for delay in receiving treatment, Potential deterioration of medical condition, Loss of IV, Increased discomfort during transfer, Possible worsening of condition or death during transfer   Accepting Physician  Jesus Salomon MD, D O    Provider Certification  General risk, such as traffic hazards, adverse weather conditions, rough terrain or turbulence, possible failure of equipment (including vehicle or aircraft), or consequences of actions of persons outside the control of the transport personnel, Unanticipated needs of medical equipment and personnel during transport, Risk of worsening condition, The possibility of a transport vehicle being unavailable      RN Documentation      [address], [address]      Follow-up Information    None         Discharge Medication List as of 8/3/2021  4:32 AM      CONTINUE these medications which have NOT CHANGED    Details   aspirin (ECOTRIN LOW STRENGTH) 81 mg EC tablet Take 81 mg by mouth daily, Historical Med      Diclofenac Sodium (VOLTAREN) 1 % Starting u 11/12/2020, Historical Med      hydrochlorothiazide (HYDRODIURIL) 12 5 mg tablet Take 12 5 mg by mouth daily, Historical Med           No discharge procedures on file      PDMP Review     None          ED Provider  Electronically Signed by           Kenny Roger MD  08/03/21 5543

## 2021-08-03 NOTE — EMTALA/ACUTE CARE TRANSFER
[facility]  [address] [address]  [phone]  Dept: [phone]      [address] TRANSFER CONSENT    NAME Cynthia Blanton                                         1970                              MRN [id number]    I have been informed of my rights regarding examination, treatment, and transfer   by Dr Racehll Polo MD    Benefits: Specialized equipment and/or services available at the receiving facility (Include comment)________________________    Risks: Potential for delay in receiving treatment, Potential deterioration of medical condition, Loss of IV, Increased discomfort during transfer, Possible worsening of condition or death during transfer      Consent for Transfer:  I acknowledge that my medical condition has been evaluated and explained to me by the emergency department physician or other qualified medical person and/or my attending physician, who has recommended that I be transferred to the service of  Accepting Physician: Dr Esthela Anand at [address] Name, [address] : [facility]  The above potential benefits of such transfer, the potential risks associated with such transfer, and the probable risks of not being transferred have been explained to me, and I fully understand them  The doctor has explained that, in my case, the benefits of transfer outweigh the risks  I agree to be transferred  I authorize the performance of emergency medical procedures and treatments upon me in both transit and upon arrival at the receiving facility  Additionally, I authorize the release of any and all medical records to the receiving facility and request they be transported with me, if possible  I understand that the safest mode of transportation during a medical emergency is an ambulance and that the Hospital advocates the use of this mode of transport   Risks of traveling to the receiving facility by car, including absence of medical control, life sustaining equipment, such as oxygen, and medical personnel has been explained to me and I fully understand them  (NINA CORRECT BOX BELOW)  [  ]  I consent to the stated transfer and to be transported by ambulance/helicopter  [  ]  I consent to the stated transfer, but refuse transportation by ambulance and accept full responsibility for my transportation by car  I understand the risks of non-ambulance transfers and I exonerate the Hospital and its staff from any deterioration in my condition that results from this refusal     X___________________________________________    DATE  21  TIME________  Signature of patient or legally responsible individual signing on patient behalf           RELATIONSHIP TO PATIENT_________________________          Provider Certification    NAME Mira Christensen                                        [facility] 1970                              MRN [id number]    A medical screening exam was performed on the above named patient  Based on the examination:    Condition Necessitating Transfer The primary encounter diagnosis was SBO (small bowel obstruction) ([facility] [address] )  A diagnosis of Nausea and vomiting was also pertinent to this visit      Patient Condition: The patient has been stabilized such that within reasonable medical probability, no material deterioration of the patient condition or the condition of the unborn child(eddie) is likely to result from the transfer    Reason for Transfer: Level of Care needed not available at this facility    Transfer Requirements: [address]   · Space available and qualified personnel available for treatment as acknowledged by    · Agreed to accept transfer and to provide appropriate medical treatment as acknowledged by       Dr Hillary Kamara  · Appropriate medical records of the examination and treatment of the patient are provided at the time of transfer   [address] _______  · Transfer will be performed by qualified personnel from    and appropriate transfer equipment as required, including the use of necessary and appropriate life support measures  Provider Certification: I have examined the patient and explained the following risks and benefits of being transferred/refusing transfer to the patient/family:  General risk, such as traffic hazards, adverse weather conditions, rough terrain or turbulence, possible failure of equipment (including vehicle or aircraft), or consequences of actions of persons outside the control of the transport personnel, Unanticipated needs of medical equipment and personnel during transport, Risk of worsening condition, The possibility of a transport vehicle being unavailable      Based on these reasonable risks and benefits to the patient and/or the unborn child(eddie), and based upon the information available at the time of the patients examination, I certify that the medical benefits reasonably to be expected from the provision of appropriate medical treatments at another medical facility outweigh the increasing risks, if any, to the individuals medical condition, and in the case of labor to the unborn child, from effecting the transfer      X____________________________________________ DATE 08/03/21        TIME_______      ORIGINAL - SEND TO MEDICAL RECORDS   COPY - SEND WITH PATIENT DURING TRANSFER

## 2021-08-03 NOTE — ED CARE HANDOFF
Emergency Department Sign Out Note        Sign out and transfer of care from Dr Vielka Worthington  See Separate Emergency Department note  The patient, Aquilino Moscoso, was evaluated by the previous provider for SBO      Workup Completed:  Labs, CT scan    ED Course / Workup Pending (followup):    2215  Awaiting evaluation by Dr Faviola Smith, Gen Surgery                                  ED Course as of Aug 03 0319   Mon Aug 02, 2021   2215 Received in sign out    Awaiting Gen Surgery to evaluate the pt      2235 Dr Faviola Smith here to see the pt       [address] Dw Dr Faviola Smith  She has d/w Dr Prado, [facility] surgeon on at [address], he has accepted the pt to his service       Tue Aug 03, 2021   1638 Transport is here for the pt        Procedures  MDM    Disposition  Final diagnoses:   SBO (small bowel obstruction) ([facility] [address] )   Nausea and vomiting     Time reflects when diagnosis was documented in both MDM as applicable and the Disposition within this note     Time User Action Codes Description Comment    8/2/2021  9:45 PM Annmarie Lora Add [K56 609] SBO (small bowel obstruction) ([facility] [address] )     8/2/2021  9:45 PM [facility], [address] [R11 2] Nausea and vomiting       ED Disposition     ED Disposition Condition Date/Time Comment    Transfer to Another Facility-In Network  Tue Aug 3, 2021  2:13 AM Transfer to [facility] [place] [place] [facility]       MD Documentation      Most Recent Value   Patient Condition  The patient has been stabilized such that within reasonable medical probability, no material deterioration of the patient condition or the condition of the unborn child(eddie) is likely to result from the transfer   Reason for Transfer  Level of Care needed not available at this facility   Benefits of Transfer  Specialized equipment and/or services available at the receiving facility (Include comment)________________________   Risks of Transfer  Potential for delay in receiving treatment, Potential deterioration of medical condition, Loss of IV, Increased discomfort during transfer, Possible worsening of condition or death during transfer   Accepting Physician  Dr Rahul Bragg Name, Reyes Richer   Sending TONY Elam    Provider Certification  General risk, such as traffic hazards, adverse weather conditions, rough terrain or turbulence, possible failure of equipment (including vehicle or aircraft), or consequences of actions of persons outside the control of the transport personnel, Unanticipated needs of medical equipment and personnel during transport, Risk of worsening condition, The possibility of a transport vehicle being unavailable      RN Documentation      Most [address] [facility] Name, Reyes Richer      Follow-up Information    None       Patient's Medications   Discharge Prescriptions    No medications on file     No discharge procedures on file         ED Provider  Electronically Signed by     James Ruiz MD  08/03/21 9241

## 2021-08-04 ENCOUNTER — APPOINTMENT (INPATIENT)
Dept: CT IMAGING | Facility: HOSPITAL | Age: 51
DRG: 394 | End: 2021-08-04
Payer: COMMERCIAL

## 2021-08-04 PROBLEM — K56.609 SMALL BOWEL OBSTRUCTION (HCC): Status: ACTIVE | Noted: 2021-08-04

## 2021-08-04 PROBLEM — I95.9 HYPOTENSION: Status: ACTIVE | Noted: 2021-08-04

## 2021-08-04 PROBLEM — Z98.890 POSTOPERATIVE STATE: Status: ACTIVE | Noted: 2021-08-04

## 2021-08-04 LAB
ANION GAP SERPL CALCULATED.3IONS-SCNC: 4 MMOL/L (ref 4–13)
BUN SERPL-MCNC: 14 MG/DL (ref 5–25)
CALCIUM SERPL-MCNC: 8 MG/DL (ref 8.3–10.1)
CHLORIDE SERPL-SCNC: 110 MMOL/L (ref 100–108)
CO2 SERPL-SCNC: 30 MMOL/L (ref 21–32)
CREAT SERPL-MCNC: 0.88 MG/DL (ref 0.6–1.3)
ERYTHROCYTE [DISTWIDTH] IN BLOOD BY AUTOMATED COUNT: 13.9 % (ref 11.6–15.1)
GFR SERPL CREATININE-BSD FRML MDRD: 76 ML/MIN/1.73SQ M
GLUCOSE P FAST SERPL-MCNC: 99 MG/DL (ref 65–99)
GLUCOSE SERPL-MCNC: 99 MG/DL (ref 65–140)
HCT VFR BLD AUTO: 42.7 % (ref 34.8–46.1)
HGB BLD-MCNC: 13.7 G/DL (ref 11.5–15.4)
LACTATE SERPL-SCNC: 0.7 MMOL/L (ref 0.5–2)
MCH RBC QN AUTO: 29.8 PG (ref 26.8–34.3)
MCHC RBC AUTO-ENTMCNC: 32.1 G/DL (ref 31.4–37.4)
MCV RBC AUTO: 93 FL (ref 82–98)
PLATELET # BLD AUTO: 214 THOUSANDS/UL (ref 149–390)
PMV BLD AUTO: 9.7 FL (ref 8.9–12.7)
POTASSIUM SERPL-SCNC: 4.1 MMOL/L (ref 3.5–5.3)
RBC # BLD AUTO: 4.6 MILLION/UL (ref 3.81–5.12)
SODIUM SERPL-SCNC: 144 MMOL/L (ref 136–145)
WBC # BLD AUTO: 9.69 THOUSAND/UL (ref 4.31–10.16)

## 2021-08-04 PROCEDURE — 80048 BASIC METABOLIC PNL TOTAL CA: CPT | Performed by: PHYSICIAN ASSISTANT

## 2021-08-04 PROCEDURE — 99024 POSTOP FOLLOW-UP VISIT: CPT | Performed by: SURGERY

## 2021-08-04 PROCEDURE — 85027 COMPLETE CBC AUTOMATED: CPT | Performed by: PHYSICIAN ASSISTANT

## 2021-08-04 PROCEDURE — 74177 CT ABD & PELVIS W/CONTRAST: CPT

## 2021-08-04 PROCEDURE — 83605 ASSAY OF LACTIC ACID: CPT | Performed by: PHYSICIAN ASSISTANT

## 2021-08-04 PROCEDURE — G1004 CDSM NDSC: HCPCS

## 2021-08-04 PROCEDURE — 99253 IP/OBS CNSLTJ NEW/EST LOW 45: CPT | Performed by: PHYSICIAN ASSISTANT

## 2021-08-04 RX ORDER — MORPHINE SULFATE 4 MG/ML
4 INJECTION, SOLUTION INTRAMUSCULAR; INTRAVENOUS EVERY 4 HOURS PRN
Status: DISCONTINUED | OUTPATIENT
Start: 2021-08-04 | End: 2021-08-05 | Stop reason: HOSPADM

## 2021-08-04 RX ADMIN — IOHEXOL 50 ML: 240 INJECTION, SOLUTION INTRATHECAL; INTRAVASCULAR; INTRAVENOUS; ORAL at 15:42

## 2021-08-04 RX ADMIN — IOHEXOL 100 ML: 350 INJECTION, SOLUTION INTRAVENOUS at 15:42

## 2021-08-04 RX ADMIN — OXYCODONE HYDROCHLORIDE 5 MG: 5 SOLUTION ORAL at 14:27

## 2021-08-04 RX ADMIN — ACETAMINOPHEN 975 MG: 325 SUSPENSION ORAL at 17:44

## 2021-08-04 RX ADMIN — FAMOTIDINE 20 MG: 10 INJECTION INTRAVENOUS at 20:01

## 2021-08-04 RX ADMIN — FAMOTIDINE 20 MG: 10 INJECTION INTRAVENOUS at 08:16

## 2021-08-04 RX ADMIN — ACETAMINOPHEN 975 MG: 325 SUSPENSION ORAL at 01:48

## 2021-08-04 RX ADMIN — SODIUM CHLORIDE, SODIUM LACTATE, POTASSIUM CHLORIDE, AND CALCIUM CHLORIDE 100 ML/HR: .6; .31; .03; .02 INJECTION, SOLUTION INTRAVENOUS at 04:20

## 2021-08-04 RX ADMIN — SODIUM CHLORIDE, SODIUM LACTATE, POTASSIUM CHLORIDE, AND CALCIUM CHLORIDE 100 ML/HR: .6; .31; .03; .02 INJECTION, SOLUTION INTRAVENOUS at 14:27

## 2021-08-04 NOTE — UTILIZATION REVIEW
Inpatient Admission Authorization Request   NOTIFICATION OF INPATIENT ADMISSION/INPATIENT AUTHORIZATION REQUEST   SERVICING FACILITY:   [address] [place] [address]  [address] [place] [address], [address]   Tax ID: [id number]  NPI: [id number]  Place of Service: [facility]patient [address]  [address]  Place of Service Code: 24     ATTENDING PROVIDER:  Attending Name and NPI#: Charlotte Maciasma [[id number]]  Address: [address] [place] [address], [address]   Phone: [phone]     UTILIZATION REVIEW CONTACT:  Anny Marcus Utilization   Network Utilization Review Department  Phone: [phone]  Fax: [phone]  Email: [email or web address]     PHYSICIAN ADVISORY SERVICES:  FOR [id number] REVIEW - MEDICAL NECESSITY DENIAL  Phone: [phone]  Fax: [phone]  Email: [email or web address]     TYPE OF REQUEST:  Inpatient Status     ADMISSION INFORMATION:  ADMISSION DATE/TIME: 8/3/21  4:34 AM  PATIENT DIAGNOSIS CODE/DESCRIPTION:  SBO (small bowel obstruction) ([facility] [address] ) [G61 404]  DISCHARGE DATE/TIME: No discharge date for patient encounter  DISCHARGE DISPOSITION (IF DISCHARGED): [address]     IMPORTANT INFORMATION:  Please contact the Anny Marcus directly with any questions or concerns regarding this request  Department voicemails are confidential     Send requests for admission clinical reviews, concurrent reviews, approvals, and administrative denials due to lack of clinical to fax [phone]

## 2021-08-04 NOTE — UTILIZATION REVIEW
Initial Clinical Review    Admission: Date/Time/Statement:   Admission Orders (From admission, onward)     Ordered        08/03/21 0457  Place in Observation  Once                   Orders Placed This Encounter   Procedures    Place in Observation     Standing Status:   Standing     Number of Occurrences:   1     Order Specific Question:   Level of Care     Answer:   Med Surg [16]           Initial Presentation: 45 yo female transferred from [facility] ED to [facility] d/t high grade small bowel obstruction with ascites and edema on CT WBC at [facility] ED 15 80  PMH morbid obesity  Presented with abd pain and nausea  Abdominal pain improved since onset and located around the periumbilical region  WBC improved  To OR for Diagnostic Laparoscopy  NPO NGT to LIS  IV anlagesics and IV antiemetics  Operative Report  LAPAROSCOPY , EGD         Diagnosis:     Pre-Op Diagnosis Codes:  Periumbilical abdominal pain [R10 33]  Nausea [R11 0]  Small bowel obstruction     Post-Op Diagnosis Codes:     * Periumbilical abdominal pain [R10 33]     * Nausea [R11 0]     * Small bowel obstruction     Procedure     1  Diagnostic Laparoscopy  2  Band tubing transection  3  Abdominal washout  4  Intraoperative Endoscopy     Specimen(s):  * No specimens in log *     Estimated Blood Loss:     20 mL        NG/OG/Enteral Tube Nasogastric 16 Fr Left nares (Active)   Site Assessment Clean;Dry; Intact 08/02/21 2228   Status Suction-low intermittent 08/02/21 2228   Drainage Appearance Bile;Green 08/02/21 2228   Number of days: 1         Anesthesia Type:      General     Operative Indications:     Periumbilical abdominal pain [R10 33]  Nausea [R11 0]  Small bowel obstruction     Operative Findings:     Partial small-bowel obstruction with dilated loops of small bowel secondary to adjustable gastric band tubing     Complications:      None         ED Triage Vitals   Temperature Pulse Respirations Blood Pressure SpO2   08/03/21 0642 08/03/21 0642 08/03/21 0815 08/03/21 0642 08/03/21 0642   97 9 °F (36 6 °C) 95 21 145/93 96 %      Temp Source Heart Rate Source Patient Position - Orthostatic VS BP Location FiO2 (%)   08/03/21 0642 -- 08/04/21 0746 08/04/21 0746 --   Oral  Lying Right arm       Pain Score       08/03/21 0638       7          Wt Readings from Last 1 Encounters:   08/02/21 122 kg (270 lb)     Additional Vital Signs:   08/04/21 07:46:17  98 4 °F (36 9 °C)  88  18  96/56  69  93 %  --  --  None (Room air)  --  Lying   08/03/21 23:22:42  98 3 °F (36 8 °C)  94  18  131/73  92  95 %  --  --  --  --  --   08/03/21 19:41:06  98 3 °F (36 8 °C)  95  19  148/90  109  98 %  --  --  --  --  --                         --                         --                         --   08/03/21 1900  --  92  --  --  --  97 %  --  --  --  --  --   08/03/21 1855  --  86  --  --  --  97 %  --  --  --  --  --                       --  --                       --  --                       --  --                       --  --                       --  --                       --  --                       --  --                       --  --   08/03/21 18:16:18  97 7 °F (36 5 °C)  90  18  153/99  117  90 %  28  2 L/min  Nasal cannula  --  --   08/03/21 1815  97 7 °F (36 5 °C)  90  --  153/99  117  90 %  --  --  --  --  --   08/03/21 1730  --  92  16  --  --  97 %  --  --  --  --  --   08/03/21 1725  --  92  16  --  --  97 %  --  --  --  --  --   08/03/21 1723  97 6 °F (36 4 °C)  84  16  143/67  --  98 %  28  2 L/min  Nasal cannula  --  --                         --                         --                         --   08/03/21 1706  --  80  16  164/93  --  97 %  28  2 L/min  Nasal cannula  --  --                         --                         --   08/03/21 1654  --  84  18  158/84  --  98 %  28  2 L/min  Nasal cannula  --  --   08/03/21 1636  97 6 °F (36 4 °C)  86  15  104/66  --  96 %  36  4 L/min  Nasal cannula  WDL  --   08/03/21 08:16:16  97 8 °F (36 6 °C)  73  18  154/97  116  96 %  --  --  --  --  --   08/03/21 0804  --  --  --  --  --  --  --  --  None (Room air)  --  --   08/03/21 06:42:35  97 9 °F (36 6 °C)  95  21  145/93  110  96 %  --  --             Pertinent Labs/Diagnostic Test Results:       Results from last 7 days   Lab Units 08/04/21  0630 08/03/21  0645 08/02/21  1950   WBC Thousand/uL 9 69 12 90* 15 80*   HEMOGLOBIN g/dL 13 7 15 7* 17 2*   HEMATOCRIT % 42 7 48 5* 51 6*   PLATELETS Thousands/uL 214 247 267   NEUTROS ABS Thousands/µL  --  10 41*  --    TOTAL NEUT ABS Thousand/uL  --   --  14 38*         Results from last 7 days   Lab Units 08/04/21  0630 08/03/21  0645 08/02/21  1950   SODIUM mmol/L 144 144 136   POTASSIUM mmol/L 4 1 4 4 3 8   CHLORIDE mmol/L 110* 107 102   CO2 mmol/L 30 26 24   ANION GAP mmol/L 4 11 10   BUN mg/dL 14 13 11   CREATININE mg/dL 0 88 1 06 0 79   EGFR ml/min/1 73sq m 76 61 87   CALCIUM mg/dL 8 0* 8 8 9 5     Results from last 7 days   Lab Units 08/03/21  0645 08/02/21  1950   AST U/L 12 11*   ALT U/L 20 10   ALK PHOS U/L 68 63   TOTAL PROTEIN g/dL 6 9 7 3   ALBUMIN g/dL 3 2* 4 1   TOTAL BILIRUBIN mg/dL 0 93 1 20*         Results from last 7 days   Lab Units 08/04/21  0630 08/03/21  0645 08/02/21  1950   GLUCOSE RANDOM mg/dL 99 128 153*       Results from last 7 days   Lab Units 08/04/21  0629 08/03/21  0921 08/02/21  2306   LACTIC ACID mmol/L 0 7 1 5 1 4       Results from last 7 days   Lab Units 08/02/21  1950   LIPASE u/L <10*       Results from last 7 days   Lab Units 08/02/21  2126   CLARITY UA  Clear   COLOR UA  Yellow   SPEC GRAV UA  <=1 005*   PH UA  6 5   GLUCOSE UA mg/dl Negative   KETONES UA mg/dl Trace*   BLOOD UA  Trace-Intact*   PROTEIN UA mg/dl Negative   NITRITE UA  Negative   BILIRUBIN UA  Negative   UROBILINOGEN UA E U /dl 0 2   LEUKOCYTES UA  Negative   WBC UA /hpf 4-10*   RBC UA /hpf 0-1   BACTERIA UA /hpf None Seen   EPITHELIAL CELLS WET PREP /hpf Occasional       Results from last 7 days   Lab Units 08/02/21  1950   TOTAL COUNTED  100     8/2 ct abd:Patient is post gastric lap band  There is a high-grade small bowel obstruction secondary to the small bowel looking around the gastric lap band catheter in the mid abdomen  Associated mesenteric edema and ascites are noted  8/2 cxr:Tip of enteric tube projects over the stomach in satisfactory position  Clear lungs        8/3 CT abd:pending    Past Medical History:   Diagnosis Date    Arthritis     Morbid obesity ([facility] [address] ) 8/3/2021         Admitting Diagnosis: SBO (small bowel obstruction) ([facility] [address] ) [K56 609]  Age/Sex: 46 y o  female  Admission Orders:  Scheduled Medications:  acetaminophen, 975 mg, Oral, Q8H   Or  acetaminophen, 975 mg, Oral, Q8H  famotidine, 20 mg, Intravenous, Q12H [address]      Continuous IV Infusions:  lactated ringers, 100 mL/hr, Intravenous, Continuous      PRN Meds:  diphenhydrAMINE, 25 mg, Oral, Q8H PRN  metoclopramide, 10 mg, Intravenous, Q6H PRN 8/3 x 1  morphine injection, 4 mg, Intravenous, Q4H PRN 8/3 x 2  ondansetron, 4 mg, Intravenous, Q6H PRN 8/3 x 2  oxyCODONE, 10 mg, Oral, Q4H PRN  oxyCODONE, 5 mg, Oral, Q4H PRN  phenol, 2 spray, Mouth/Throat, Q2H PRN  promethazine, 25 mg, Intravenous, Q6H PRN  simethicone, 80 mg, Oral, 4x Daily PRN    scd  Npo  IS  Tele  oob  OR-LAPAROSCOPY DIAGNOSTIC    IP CONSULT TO INTERNAL MEDICINE    Network Utilization Review Department  ATTENTION: Please call with any questions or concerns to [phone] and carefully listen to the prompts so that you are directed to the right person  All voicemails are confidential   Maximino Curran all requests for admission clinical reviews, approved or denied determinations and any other requests to dedicated fax number below belonging to the campus where the patient is receiving treatment   List of dedicated fax numbers for the Facilities:  [address] DENIALS (Administrative/Medical Necessity) [phone]   [address] [place] [address] (Maternity/NICU/Pediatrics) [phone]     [address]  [facility] [address] [place] [address] [facility] [place] 0[address] [place] [address] Eleni [address]oa [address] [place] [address] [phone]

## 2021-08-04 NOTE — CONSULTS
Inpatient Medical Consultation - [facility] Internal Medicine    Patient Information: Beto Pittman 46 y o  female MRN: [id number]  Unit/Bed#: E5 -01 Encounter: [id number]  PCP: Cuauhtemoc Escobar DO  Date of Admission:  8/3/2021  Date of Consultation: 08/04/21  Requesting Physician: Jeanie Alvarez MD    Reason For Consultation: Postoperative medical management    Assessment/Plan:    · Small-bowel obstruction: Presenting with complaint of abdominal pain and to [facility]   Transferred to [facility] for bariatric evaluation/surgery  Status post diagnostic laparoscopy by bariatric surgery 8/3/21 -Dr Erickson Medley along with band tubing transection, abdominal washout, and intraoperative endoscopy  Continue management per primary team, bariatric  Pending abdominal CT scan  · Morbid obesity: Status post laparoscopic band insertion and revision approximately 8 years ago  · Essential hypertension: Home regimen HCTZ 12 5 mg daily  Currently on hold in the setting of bariatric surgery and NPO status  · Hypotension: Patient actually with episode of hypotension this a m  96/56  Previously had elevated blood pressures in the 150s  Continue to hold blood pressure medication given soft pressure  On supportive IV fluids while NPO  VTE Prophylaxis: Per primary team     Counseling / Coordination of Care Time: 30 minutes  Greater than 50% of total time spent on patient counseling and coordination of care  History of Present Illness:    Beto Pittman is a 46 y o  female who is originally admitted to the bariatric service on 8/3/2021  Patient initially presented to at [facility] and was found have a small bowel obstruction  Bariatric team was contacted at [address] [place] [address] and patient was transferred for higher level of care/bariatric surgery evaluation  She underwent diagnostic laparoscopy on 8/3/21  She required placement of an NG tube  We are consulted for postoperative medical management  Patient's past medical history of laparoscopic cholecystectomy, laparoscopic band and band revision approximately 8 years ago  At home she takes aspirin 81 mg prophylactically and hydrochlorothiazide 12 5 mg daily for her blood pressure  Postoperatively, patient is feeling better than yesterday  She is passing gas  Notes some abdominal discomfort but this overall has improved  NG tube currently in place but not hooked up to suction  Review of Systems:    Review of Systems   Constitutional: Negative for chills and fever  HENT: Negative for congestion  Respiratory: Negative for chest tightness and shortness of breath  Cardiovascular: Negative for chest pain, palpitations and leg swelling  Gastrointestinal: Positive for abdominal pain  Passing flatulence   Genitourinary: Negative for dysuria  Neurological: Negative for dizziness  Psychiatric/Behavioral: Negative for agitation         Past Medical and Surgical History:     Past Medical History:   Diagnosis Date    Arthritis     Morbid obesity ([facility] [address] ) 8/3/2021       Past Surgical History:   Procedure Laterality Date    ABDOMINAL SURGERY      CHOLECYSTECTOMY      HYSTERECTOMY      LAPAROSCOPIC GASTRIC BANDING      ME LAP,DIAGNOSTIC ABDOMEN N/A 8/3/2021    Procedure: LAPAROSCOPY , BAND TUBE RELEASE, LYSIS OF ADHESIONS, EGD;  Surgeon: Ronald Gomez MD;  Location: [facility];  Service: Bariatrics       Meds/Allergies:    all medications and allergies reviewed    Allergies: No Known Allergies    Social History:     Marital Status: /Civil Union    Substance Use History:   Social History     Substance and Sexual Activity   Alcohol Use Never     Social History     Tobacco Use   Smoking Status Never Smoker   Smokeless Tobacco Never Used     Social History     Substance and Sexual Activity   Drug Use Never       Family History:    non-contributory    Physical Exam:     Vitals:   Blood Pressure: 96/56 (08/04/21 0746)  Pulse: 88 (08/04/21 0746)  Temperature: 98 4 °F (36 9 °C) (08/04/21 0746)  Temp Source: Oral (08/04/21 0746)  Respirations: 18 (08/04/21 0746)  Height: 5' 7" (170 2 cm) (08/03/21 0642)  SpO2: 93 % (08/04/21 0746)    Physical Exam  Vitals and nursing note reviewed  Constitutional:       General: She is not in acute distress  Appearance: Normal appearance  She is obese  She is not ill-appearing, toxic-appearing or diaphoretic  HENT:      Head: Normocephalic and atraumatic  Eyes:      General: No scleral icterus  Cardiovascular:      Rate and Rhythm: Normal rate and regular rhythm  Pulmonary:      Effort: Pulmonary effort is normal  No respiratory distress  Breath sounds: Normal breath sounds  No stridor  No wheezing or rhonchi  Abdominal:      General: Bowel sounds are normal  There is no distension  Palpations: Abdomen is soft  There is no mass  Tenderness: There is no abdominal tenderness  Musculoskeletal:         General: No swelling  Skin:     General: Skin is warm and dry  Neurological:      Mental Status: She is alert and oriented to person, place, and time  Mental status is at baseline  Psychiatric:         Mood and Affect: Mood normal          Behavior: Behavior normal          Additional Data:     Lab Results: I have personally reviewed pertinent reports  Results from last 7 days   Lab Units 08/04/21  0630 08/03/21  0645   WBC Thousand/uL 9 69 12 90*   HEMOGLOBIN g/dL 13 7 15 7*   HEMATOCRIT % 42 7 48 5*   PLATELETS Thousands/uL 214 247   NEUTROS PCT %  --  80*   LYMPHS PCT %  --  13*   MONOS PCT %  --  5   EOS PCT %  --  1     Results from last 7 days   Lab Units 08/04/21  0630 08/03/21  0645   POTASSIUM mmol/L 4 1 4 4   CHLORIDE mmol/L 110* 107   CO2 mmol/L 30 26   BUN mg/dL 14 13   CREATININE mg/dL 0 88 1 06   CALCIUM mg/dL 8 0* 8 8   ALK PHOS U/L  --  68   ALT U/L  --  20   AST U/L  --  12           Imaging: I have personally reviewed pertinent reports        XR chest 1 view portable    Result Date: 8/3/2021  Narrative: CHEST INDICATION:   ng tube placement  COMPARISON:  No prior chest radiograph available for comparison  Correlation with CT August 2, 2021 at 20:37 EXAM PERFORMED/VIEWS:  XR CHEST PORTABLE FINDINGS: Tip of enteric tube projects over the stomach  Radiopaque material overlying the region of the gastroesophageal junction reflects gastric lap band seen to advantage on prior CT  Cardiomediastinal silhouette appears unremarkable  The lungs are clear  No pneumothorax or pleural effusion  Osseous structures appear within normal limits for patient age  Impression: Tip of enteric tube projects over the stomach in satisfactory position  Clear lungs  Workstation performed: [id number]     CT abdomen pelvis with contrast    Result Date: 8/2/2021  Narrative: CT ABDOMEN AND PELVIS WITH IV CONTRAST INDICATION:   Epigastric/left upper quadrant abdominal pain with nausea vomiting  COMPARISON:  3/22/2021 TECHNIQUE:  CT examination of the abdomen and pelvis was performed  Axial, sagittal, and coronal 2D reformatted images were created from the source data and submitted for interpretation  Radiation dose length product (DLP) for this visit:  1418 6 mGy-cm   This examination, like all CT scans performed in the [facility], was performed utilizing techniques to minimize radiation dose exposure, including the use of iterative  reconstruction and automated exposure control  IV Contrast:  100 mL of iohexol (OMNIPAQUE) Enteric Contrast:  Enteric contrast was not administered  FINDINGS: ABDOMEN LOWER CHEST:  No clinically significant abnormality identified in the visualized lower chest  LIVER/BILIARY TREE:  Liver is diffusely decreased in density consistent with fatty change  No CT evidence of suspicious hepatic mass  Normal hepatic contours  No biliary dilatation  GALLBLADDER:  Gallbladder is surgically absent  SPLEEN:  Unremarkable  PANCREAS:  Unremarkable   ADRENAL GLANDS:  There is low density lobulated thickening of adrenal glands bilaterally statistically most likely to represent adenomatous hyperplasia  KIDNEYS/URETERS:  Unremarkable  No hydronephrosis  STOMACH AND BOWEL:  There is colonic diverticulosis without evidence of acute diverticulitis  Patient is post gastric lap band  There is a high-grade small bowel obstruction secondary to the small bowel looking around the gastric lap band catheter in the mid abdomen  APPENDIX:  A normal appendix was visualized  ABDOMINOPELVIC CAVITY:  Moderate ascites  No pneumoperitoneum  No lymphadenopathy  VESSELS:  Unremarkable for patient's age  PELVIS REPRODUCTIVE ORGANS:  Unremarkable for patient's age  URINARY BLADDER:  Unremarkable  ABDOMINAL WALL/INGUINAL REGIONS:  Unremarkable  OSSEOUS STRUCTURES:  No acute fracture or destructive osseous lesion  Impression: Patient is post gastric lap band  There is a high-grade small bowel obstruction secondary to the small bowel looking around the gastric lap band catheter in the mid abdomen  Associated mesenteric edema and ascites are noted  I personally discussed this study with Kely Larson on 8/2/2021 at 9:24 PM  Workstation performed: [id number]       ** Please Note: This note has been constructed using a voice recognition system   **

## 2021-08-04 NOTE — PROGRESS NOTES
Progress Note - Bariatric Surgery   Asher Brown 46 y o  female MRN: [id number]  Unit/Bed#: E5 -01 Encounter: [id number]      Subjective/Objective     Subjective:  Patient s/p diagnostic laparoscopy for bowel obstruction POD 1  She has remained NPO overnight with NGT to LCWS  She is passing flatus this AM  Patient denies fevers, chills, nausea, vomiting  Objective:    BP 96/56 (BP Location: Right arm)   Pulse 88   Temp 98 4 °F (36 9 °C) (Oral)   Resp 18   Ht 5' 7" (1 702 m)   SpO2 93%   BMI 42 29 kg/m²       Intake/Output Summary (Last 24 hours) at 8/4/2021 4042  Last data filed at 8/4/2021 0420  Gross per 24 hour   Intake 1600 ml   Output 470 ml   Net 1130 ml       Invasive Devices     Peripheral Intravenous Line            Peripheral IV 08/03/21 Right Antecubital <1 day          Drain            NG/OG/Enteral Tube Nasogastric 16 Fr Left nares 1 day                ROS: 10-point system completed  All negative except see HPI  Physical Exam    General Appearance:    Alert, cooperative, no distress, appears stated age   Head:    Normocephalic, without obvious abnormality, atraumatic   Lungs:     Respirations unlabored   Heart:    Regular rate and rhythm   Abdomen:     Soft, appropriate tenderness limited to the incisions, otherwise, nondistended  NGT with 150 mL bilious output overnight  Extremities:   Extremities normal, atraumatic, no cyanosis or edema   Neurologic:  Incision:  Psych:   Normal strength and sensation    Clean, dry, and intact, no bleeding    Normal mood and affect       Lab, Imaging and other studies:I have personally reviewed pertinent lab results  VTE Mechanical Prophylaxis: sequential compression device    Assessment/Plan  1)  Patient with history of gastric band admitted with SBO, s/p diagnostic laparoscopy POD 1  Patient afebrile and hemodynamically stable, passing flatus  Patient likely with improvement of SBO given presence of bowel function   However, will obtain CT with PO/IV contrast to evaluate for progression of contrast    - Will remove NGT  - Recommend ambulation, use of SCDs when not ambulating,   - Incentive Spirometry  - Plan to D/C patient home today pending anticipated progression    Plan of care was discussed with patient    Care plan discussed with

## 2021-08-04 NOTE — H&P
Consultation - Bariatric Surgery   Beto Pittman 46 y o  female MRN: [id number]  Unit/Bed#: E5 -01 Encounter: [id number]    Assessment/Plan     Assessment:  Periumbilical abdominal pain with high grade small bowel obstruction with ascites and edema on CT, stable  Patient laying comfortably on bed in no acute distress  Abdomen nonperitoneal with mild RUQ and periumbilical pain to deep palpation  Lipase low at 10  Lactic Acid WNL  Leukocytosis at 15 80  Total bilirubin increased 1 2  Vitals show tachycardia and HTN at 162/96    Plan:  Patient stable and in no acute distress  Nonperitoneal abdomen with no rebound or guarding  Emergent surgical intervention not necessary at this time  Will transfer patient level 2 to [address] [address] for overnight observation and likely Diagnostic Laparoscopy tomorrow  Admit under Dr Montoya [facility] service med surg      - IV LR hydration  - Keep patient NPO  - Pain control PRN    History of Present Illness     HPI:  Beto Pittman is a 46 y o  female Body mass index is 42 29 kg/m²  with history of Gastric Band placement 8 years ago with Dr William Riddle who presents to the [facility] ED with complaints of periumbilical pain associated vomiting and intolerance to PO intake  Patient notes having some issues with band including intermittent abdominal pain for the past few months for which a previous CT was obtained on 3/22 and showed status post gastric lap band placement with access port is twisted in the left upper quadrant subcutaneous soft tissues with increased fluid in the pocket surrounding the port  Since then she has tried to get established with our service and had records transferred but has not yet been seen in the office  At 1:30AM this morning she developed severe periumbilical pain and laid in bed until she came into ED because she was unable to tolerate anything by mouth all day  CT obtained today shows "Patient is post gastric lap band   There is a high-grade small bowel obstruction secondary to the small bowel looking around the gastric lap band catheter in the mid abdomen  Associated mesenteric edema and ascites are noted " She denies smoking, alcohol use, and admits to minimal caffeine intake  She admits to using NSAIDs on occasion  Currently has NGT in place  Last bowel movement was on Sunday this week             Consults    Review of Systems   Constitutional: Negative for chills and fever  HENT: Negative for ear pain and sore throat  Eyes: Negative for pain and visual disturbance  Respiratory: Negative for cough and shortness of breath  Cardiovascular: Negative for chest pain and palpitations  Gastrointestinal: Positive for abdominal pain and vomiting  Genitourinary: Negative for dysuria and hematuria  Musculoskeletal: Negative for arthralgias and back pain  Skin: Negative for color change and rash  Neurological: Negative for seizures and syncope  All other systems reviewed and are negative        Historical Information   Past Medical History:   Diagnosis Date    Arthritis     Morbid obesity ([facility] [address] ) 8/3/2021     Past Surgical History:   Procedure Laterality Date    ABDOMINAL SURGERY      CHOLECYSTECTOMY      HYSTERECTOMY      LAPAROSCOPIC GASTRIC BANDING      UT LAP,DIAGNOSTIC ABDOMEN N/A 8/3/2021    Procedure: LAPAROSCOPY , BAND TUBE RELEASE, LYSIS OF ADHESIONS, EGD;  Surgeon: Collins Shukla MD;  Location: [facility] OR;  Service: Bariatrics     Social History   Social History     Substance and Sexual Activity   Alcohol Use Never     Social History     Substance and Sexual Activity   Drug Use Never     Social History     Tobacco Use   Smoking Status Never Smoker   Smokeless Tobacco Never Used     Family History: non-contributory    Meds/Allergies   all current active meds have been reviewed  No Known Allergies    Objective   First Vitals:   Blood Pressure: 145/93 (08/03/21 0642)  Pulse: 95 (08/03/21 0642)  Temperature: 97 9 °F (36 6 °C) (08/03/21 0642)  Temp Source: Oral (08/03/21 9785)  Respirations: 21 (08/03/21 0642)  Height: 5' 7" (170 2 cm) (08/03/21 0642)  SpO2: 96 % (08/03/21 0642)    Current Vitals:   Blood Pressure: 96/56 (08/04/21 0746)  Pulse: 88 (08/04/21 0746)  Temperature: 98 4 °F (36 9 °C) (08/04/21 0746)  Temp Source: Oral (08/04/21 0746)  Respirations: 18 (08/04/21 0746)  Height: 5' 7" (170 2 cm) (08/03/21 0642)  SpO2: 93 % (08/04/21 0746)      Intake/Output Summary (Last 24 hours) at 8/4/2021 1517  Last data filed at 8/4/2021 1433  Gross per 24 hour   Intake 1600 ml   Output 770 ml   Net 830 ml       Invasive Devices     Peripheral Intravenous Line            Peripheral IV 08/03/21 Right Antecubital 1 day          Drain            NG/OG/Enteral Tube Nasogastric 16 Fr Left nares 1 day                Physical Exam  Constitutional:       Appearance: Normal appearance  She is obese  HENT:      Head: Normocephalic and atraumatic  Nose: Nose normal       Mouth/Throat:      Mouth: Mucous membranes are moist    Eyes:      Extraocular Movements: Extraocular movements intact  Pupils: Pupils are equal, round, and reactive to light  Cardiovascular:      Rate and Rhythm: Normal rate and regular rhythm  Pulses: Normal pulses  Heart sounds: Normal heart sounds  Pulmonary:      Effort: Pulmonary effort is normal       Breath sounds: Normal breath sounds  Abdominal:      Palpations: Abdomen is soft  Tenderness: There is abdominal tenderness  There is no guarding or rebound  Comments: Obese, port palpable in LUQ, nonperitoneal, mild tenderness to deep palpation in RUQ and periumbilical region   Musculoskeletal:      Cervical back: Normal range of motion  Skin:     General: Skin is warm  Neurological:      General: No focal deficit present  Mental Status: She is alert and oriented to person, place, and time     Psychiatric:         Mood and Affect: Mood normal          Behavior: Behavior normal          Lab Results:   I have personally reviewed pertinent lab results  , CBC:   Lab Results   Component Value Date    WBC 9 69 08/04/2021    HGB 13 7 08/04/2021    HCT 42 7 08/04/2021    MCV 93 08/04/2021     08/04/2021    MCH 29 8 08/04/2021    MCHC 32 1 08/04/2021    RDW 13 9 08/04/2021    MPV 9 7 08/04/2021   , CMP:   Lab Results   Component Value Date    SODIUM 144 08/04/2021    K 4 1 08/04/2021     (H) 08/04/2021    CO2 30 08/04/2021    BUN 14 08/04/2021    CREATININE 0 88 08/04/2021    CALCIUM 8 0 (L) 08/04/2021    EGFR 76 08/04/2021   , Urinalysis:   No results found for: Elesa Trenton, SPECGRAV, PHUR, LEUKOCYTESUR, NITRITE, PROTEINUA, GLUCOSEU, KETONESU, BILIRUBINUR, BLOODU, Lipase:   No results found for: LIPASE  Imaging: I have personally reviewed pertinent reports  EKG, Pathology, and Other Studies: I have personally reviewed pertinent reports  CT ABDOMEN AND PELVIS WITH IV CONTRAST     INDICATION:   Epigastric/left upper quadrant abdominal pain with nausea vomiting      COMPARISON:  3/22/2021     TECHNIQUE:  CT examination of the abdomen and pelvis was performed  Axial, sagittal, and coronal 2D reformatted images were created from the source data and submitted for interpretation      Radiation dose length product (DLP) for this visit:  1418 6 mGy-cm   This examination, like all CT scans performed in the [facility], was performed utilizing techniques to minimize radiation dose exposure, including the use of iterative   reconstruction and automated exposure control      IV Contrast:  100 mL of iohexol (OMNIPAQUE)  Enteric Contrast:  Enteric contrast was not administered      FINDINGS:     ABDOMEN     LOWER CHEST:  No clinically significant abnormality identified in the visualized lower chest      LIVER/BILIARY TREE:  Liver is diffusely decreased in density consistent with fatty change  No CT evidence of suspicious hepatic mass    Normal hepatic contours  No biliary dilatation      GALLBLADDER:  Gallbladder is surgically absent      SPLEEN:  Unremarkable      PANCREAS:  Unremarkable      ADRENAL GLANDS:  There is low density lobulated thickening of adrenal glands bilaterally statistically most likely to represent adenomatous hyperplasia      KIDNEYS/URETERS:  Unremarkable  No hydronephrosis      STOMACH AND BOWEL:  There is colonic diverticulosis without evidence of acute diverticulitis      Patient is post gastric lap band  There is a high-grade small bowel obstruction secondary to the small bowel looking around the gastric lap band catheter in the mid abdomen      APPENDIX:  A normal appendix was visualized      ABDOMINOPELVIC CAVITY:  Moderate ascites  No pneumoperitoneum  No lymphadenopathy      VESSELS:  Unremarkable for patient's age      PELVIS     REPRODUCTIVE ORGANS:  Unremarkable for patient's age      URINARY BLADDER:  Unremarkable      ABDOMINAL WALL/INGUINAL REGIONS:  Unremarkable      OSSEOUS STRUCTURES:  No acute fracture or destructive osseous lesion      IMPRESSION:  Patient is post gastric lap band  There is a high-grade small bowel obstruction secondary to the small bowel looking around the gastric lap band catheter in the mid abdomen  Associated mesenteric edema and ascites are noted      I personally discussed this study with Bere Wolfe on 8/2/2021 at 9:24 PM      Workstation performed: [id number]        Counseling / Coordination of Care  Total floor / unit time spent today 30 minutes  Greater than 50% of total time was spent with the patient and / or family counseling and / or coordination of care

## 2021-08-04 NOTE — UTILIZATION REVIEW
Elective Surgical Continued Stay Review  (completed on  8/4)      Admission Orders (From admission, onward)     Ordered        08/04/21 0945  Inpatient Admission  Once         08/03/21 0457  Place in Observation  Once                   8/04  Status corrected to INPATIENT  (MS level of care)      obs status entered on 8/3 in error      Assessment/Plan: 46 y o  female, initial surgery date 8/03: performed  Diagnostic Laparoscopy;  Band tubing transection; Abdominal washout; Intraoperative Endoscopy     Operative Findings:    Partial small-bowel obstruction with dilated loops of small bowel secondary to adjustable gastric band tubing    Postop/ms level of care  Keep NPO;  NG Tube to LWCS ; Hourly inc spirometry;   scd's     8/4  POD 1 :    Passing flatus this am   CTAP this am revealed significant improvement of bowel dilatation with progression of oral contrast to the distal colon  Will allow for liquid diet and will plan for elective band removal in a couple of weeks once bowel distension has resolved  Dc NG tube - allow bariatric clear liquids         Pertinent Labs/Diagnostic Results:   8/2  CTAP -  Patient is post gastric lap band  There is a high-grade small bowel obstruction secondary to the small bowel looking around the gastric lap band catheter in the mid abdomen  Associated mesenteric edema and ascites are noted  8/4   CTAP -  significant improvement of bowel dilatation with progression of oral contrast to the distal colon          Results from last 7 days   Lab Units 08/04/21  0630 08/03/21 0645 08/02/21 1950   WBC Thousand/uL 9 69 12 90* 15 80*   HEMOGLOBIN g/dL 13 7 15 7* 17 2*   HEMATOCRIT % 42 7 48 5* 51 6*   PLATELETS Thousands/uL 214 247 267   NEUTROS ABS Thousands/µL  --  10 41*  --    TOTAL NEUT ABS Thousand/uL  --   --  14 38*         Results from last 7 days   Lab Units 08/04/21  0630 08/03/21  0645 08/02/21 1950   SODIUM mmol/L 144 144 136   POTASSIUM mmol/L 4 1 4 4 3 8 CHLORIDE mmol/L 110* 107 102   CO2 mmol/L 30 26 24   ANION GAP mmol/L 4 11 10   BUN mg/dL 14 13 11   CREATININE mg/dL 0 88 1 06 0 79   EGFR ml/min/1 73sq m 76 61 87   CALCIUM mg/dL 8 0* 8 8 9 5     Results from last 7 days   Lab Units 08/03/21  0645 08/02/21  1950   AST U/L 12 11*   ALT U/L 20 10   ALK PHOS U/L 68 63   TOTAL PROTEIN g/dL 6 9 7 3   ALBUMIN g/dL 3 2* 4 1   TOTAL BILIRUBIN mg/dL 0 93 1 20*         Results from last 7 days   Lab Units 08/04/21  0630 08/03/21  0645 08/02/21  1950   GLUCOSE RANDOM mg/dL 99 128 153*     Results from last 7 days   Lab Units 08/04/21  0629 08/03/21  0921 08/02/21  2306   LACTIC ACID mmol/L 0 7 1 5 1 4     Results from last 7 days   Lab Units 08/02/21  1950   LIPASE u/L <10*     Results from last 7 days   Lab Units 08/02/21  2126   CLARITY UA  Clear   COLOR UA  Yellow   SPEC GRAV UA  <=1 005*   PH UA  6 5   GLUCOSE UA mg/dl Negative   KETONES UA mg/dl Trace*   BLOOD UA  Trace-Intact*   PROTEIN UA mg/dl Negative   NITRITE UA  Negative   BILIRUBIN UA  Negative   UROBILINOGEN UA E U /dl 0 2   LEUKOCYTES UA  Negative   WBC UA /hpf 4-10*   RBC UA /hpf 0-1   BACTERIA UA /hpf None Seen   EPITHELIAL CELLS WET PREP /hpf Occasional     Vital Signs:   08/04/21 07:46:17  98 4 °F (36 9 °C)  88  18  96/56  69  93 %  --  --  None (Room air)  --  Lying   08/03/21 23:22:42  98 3 °F (36 8 °C)  94  18  131/73  92  95 %  --  --  --  --  --   08/03/21 19:41:06  98 3 °F (36 8 °C)  95  19  148/90  109  98 %                 Medications:   Scheduled Medications:  acetaminophen, 975 mg, Oral, Q8H   Or  acetaminophen, 975 mg, Oral, Q8H  famotidine, 20 mg, Intravenous, Q12H [facility]      Continuous IV Infusions:  lactated ringers, 100 mL/hr, Intravenous, Continuous      PRN Meds:  diphenhydrAMINE, 25 mg, Oral, Q8H PRN  metoclopramide, 10 mg, Intravenous, Q6H PRN    8/03 @  0820  morphine injection, 4 mg, Intravenous, Q4H PRN   8/03  x2  ondansetron, 4 mg, Intravenous, Q6H PRN   8/03  @  9912 and 1816  oxyCODONE, 10 mg, Oral, Q4H PRN  oxyCODONE, 5 mg, Oral, Q4H PRN   8/4 @  1430  phenol, 2 spray, Mouth/Throat, Q2H PRN  promethazine, 25 mg, Intravenous, Q6H PRN  simethicone, 80 mg, Oral, 4x Daily PRN          Discharge Plan:  d     Network Utilization Review Department  ATTENTION: Please call with any questions or concerns to [phone] and carefully listen to the prompts so that you are directed to the right person  All voicemails are confidential   [facility] all requests for admission clinical reviews, approved or denied determinations and any other requests to dedicated fax number below belonging to the campus where the patient is receiving treatment   List of dedicated fax numbers for the Facilities:  [address] DENIALS (Administrative/Medical Necessity) [phone]   [address] (Maternity/NICU/Pediatrics) [phone]   [address] [place] [address] [facility] [address] [address] [address] [place] [address] [phone] [place] [address] [phone]

## 2021-08-05 VITALS
BODY MASS INDEX: 42.29 KG/M2 | DIASTOLIC BLOOD PRESSURE: 61 MMHG | RESPIRATION RATE: 18 BRPM | SYSTOLIC BLOOD PRESSURE: 119 MMHG | OXYGEN SATURATION: 95 % | TEMPERATURE: 97.9 F | HEART RATE: 60 BPM | HEIGHT: 67 IN

## 2021-08-05 LAB
ALBUMIN SERPL BCP-MCNC: 2.5 G/DL (ref 3.5–5)
ALP SERPL-CCNC: 61 U/L (ref 46–116)
ALT SERPL W P-5'-P-CCNC: 24 U/L (ref 12–78)
ANION GAP SERPL CALCULATED.3IONS-SCNC: 6 MMOL/L (ref 4–13)
AST SERPL W P-5'-P-CCNC: 21 U/L (ref 5–45)
BILIRUB SERPL-MCNC: 1.04 MG/DL (ref 0.2–1)
BUN SERPL-MCNC: 9 MG/DL (ref 5–25)
CALCIUM ALBUM COR SERPL-MCNC: 9.3 MG/DL (ref 8.3–10.1)
CALCIUM SERPL-MCNC: 8.1 MG/DL (ref 8.3–10.1)
CHLORIDE SERPL-SCNC: 107 MMOL/L (ref 100–108)
CO2 SERPL-SCNC: 28 MMOL/L (ref 21–32)
CREAT SERPL-MCNC: 0.82 MG/DL (ref 0.6–1.3)
ERYTHROCYTE [DISTWIDTH] IN BLOOD BY AUTOMATED COUNT: 13.5 % (ref 11.6–15.1)
GFR SERPL CREATININE-BSD FRML MDRD: 83 ML/MIN/1.73SQ M
GLUCOSE SERPL-MCNC: 84 MG/DL (ref 65–140)
HCT VFR BLD AUTO: 39.8 % (ref 34.8–46.1)
HGB BLD-MCNC: 12.7 G/DL (ref 11.5–15.4)
MCH RBC QN AUTO: 29.3 PG (ref 26.8–34.3)
MCHC RBC AUTO-ENTMCNC: 31.9 G/DL (ref 31.4–37.4)
MCV RBC AUTO: 92 FL (ref 82–98)
PLATELET # BLD AUTO: 181 THOUSANDS/UL (ref 149–390)
PMV BLD AUTO: 9.7 FL (ref 8.9–12.7)
POTASSIUM SERPL-SCNC: 3.7 MMOL/L (ref 3.5–5.3)
PROT SERPL-MCNC: 5.6 G/DL (ref 6.4–8.2)
RBC # BLD AUTO: 4.33 MILLION/UL (ref 3.81–5.12)
SODIUM SERPL-SCNC: 141 MMOL/L (ref 136–145)
WBC # BLD AUTO: 6.09 THOUSAND/UL (ref 4.31–10.16)

## 2021-08-05 PROCEDURE — NC001 PR NO CHARGE: Performed by: SURGERY

## 2021-08-05 PROCEDURE — 99024 POSTOP FOLLOW-UP VISIT: CPT | Performed by: SURGERY

## 2021-08-05 PROCEDURE — 85027 COMPLETE CBC AUTOMATED: CPT | Performed by: PHYSICIAN ASSISTANT

## 2021-08-05 PROCEDURE — 80053 COMPREHEN METABOLIC PANEL: CPT | Performed by: PHYSICIAN ASSISTANT

## 2021-08-05 RX ORDER — OMEPRAZOLE 20 MG/1
20 CAPSULE, DELAYED RELEASE ORAL DAILY
Qty: 30 CAPSULE | Refills: 2 | Status: SHIPPED | OUTPATIENT
Start: 2021-08-05 | End: 2021-12-23 | Stop reason: SDUPTHER

## 2021-08-05 RX ORDER — METOCLOPRAMIDE HYDROCHLORIDE 5 MG/ML
10 INJECTION INTRAMUSCULAR; INTRAVENOUS EVERY 6 HOURS PRN
Status: DISCONTINUED | OUTPATIENT
Start: 2021-08-05 | End: 2021-08-05 | Stop reason: HOSPADM

## 2021-08-05 RX ORDER — ACETAMINOPHEN 160 MG/5ML
975 SUSPENSION, ORAL (FINAL DOSE FORM) ORAL EVERY 8 HOURS
Qty: 638.4 ML | Refills: 0 | Status: SHIPPED | OUTPATIENT
Start: 2021-08-05 | End: 2021-08-13

## 2021-08-05 RX ADMIN — FAMOTIDINE 20 MG: 10 INJECTION INTRAVENOUS at 08:52

## 2021-08-05 RX ADMIN — ACETAMINOPHEN 975 MG: 325 TABLET, FILM COATED ORAL at 10:21

## 2021-08-05 RX ADMIN — ACETAMINOPHEN 975 MG: 325 TABLET, FILM COATED ORAL at 02:47

## 2021-08-05 NOTE — DISCHARGE INSTR - AVS FIRST PAGE
your medications from [address] in [facility] [address]   Take Tylenol every 8 hours around the clock, unless instructed otherwise  Take your omeprazole daily  It is important to stay hydrated and follow your discharge diet progression   Mild nausea is ok as long as you can drink fluids, sip very slowly and get up and walk during any periods of nausea  You may shower normally after 48 hours, but do not scrub incision sites, blot gently with clean towel to dry incisions  Take home medications as usual unless instructed otherwise while in hospital  Follow up with Dr Jayde Quintero and your PCP within the next week

## 2021-08-05 NOTE — PROGRESS NOTES
Discharge Note - General Surgery   Aimee Notice 46 y o  female MRN: [id number]  Unit/Bed#: E5 -01 Encounter: [id number]    Date of Admission: 8/3/2021    Date of Discharge: 8/5/2021    HPI  47 yo F with history of gastric band presented to the hospital with complaints of periumbilical pain associated vomiting and intolerance to PO intake on 8/3/21  Physical exam showed tenderness to palpation of left mid abdomen  WBC and lactic acid levels were normal  Work up in ED with CT scan revealed a small bowel obstruction secondary to tubing of the port  Hospital Course: The patient was admitted to the hospital and an NGT for gastric decompression was placed  She was taken to the OR for a diagnostic laparoscopy on 8/3/21  Intra-op findings were significant for a small bowel obstruction, with significant adhesive disease  The adhesions were dense, and it was determined that laparoscopic lysis would increase risk to bowel injury  The band  tubing  was transected and we saw that the distal bowel was peristalsing and filling with contents from proximal bowel  We therefore decided to defer lysis of adhesions and monitor the patient clinically post-op  On POD1, a CT with PO contrast confirmed that there was contrast within the colon  The NGT was removed and the patient was started on a clear liquid diet  She was tolerating well, passing flatus, and stable for DC to home  Physical Exam On Discharge    Blood pressure 119/61, pulse 60, temperature 97 9 °F (36 6 °C), resp  rate 18, height 5' 7" (1 702 m), SpO2 95 %  ,Body mass index is 42 29 kg/m²        Intake/Output Summary (Last 24 hours) at 8/5/2021 1051  Last data filed at 8/5/2021 0936  Gross per 24 hour   Intake --   Output 2650 ml   Net -2650 ml       Invasive Devices     Peripheral Intravenous Line            Peripheral IV 08/03/21 Right Antecubital 1 day                Physical Exam: /61   Pulse 60   Temp 97 9 °F (36 6 °C)   Resp 18   Ht 5' 7" (1 702 m)   SpO2 95%   BMI 42 29 kg/m²     General Appearance:    Alert, cooperative, no distress, appears stated age   Head:    Normocephalic, without obvious abnormality, atraumatic   Eyes:    PERRL, conjunctiva/corneas clear, EOM's intact, fundi     benign, both eyes   Ears:    Normal TM's and external ear canals, both ears   Nose:   Nares normal, septum midline, mucosa normal, no drainage    or sinus tenderness   Throat:   Lips, mucosa, and tongue normal; teeth and gums normal   Neck:   Supple, symmetrical, trachea midline, no adenopathy;     thyroid:  no enlargement/tenderness/nodules; no carotid    bruit or JVD   Back:     Symmetric, no curvature, ROM normal, no CVA tenderness   Lungs:     Clear to auscultation bilaterally, respirations unlabored   Chest Wall:    No tenderness or deformity    Heart:    Regular rate and rhythm, S1 and S2 normal, no murmur, rub   or gallop   Breast Exam:    No tenderness, masses, or nipple abnormality   Abdomen:     Soft, non-tender, bowel sounds active all four quadrants,     no masses, no organomegaly   Genitalia:    Normal female without lesion, discharge or tenderness   Rectal:    Normal tone, no masses or tenderness; guaiac negative stool   Extremities:   Extremities normal, atraumatic, no cyanosis or edema   Pulses:   2+ and symmetric all extremities   Skin:   Skin color, texture, turgor normal, no rashes or lesions   Lymph nodes:   Cervical, supraclavicular, and axillary nodes normal   Neurologic:   CNII-XII intact, normal strength, sensation and reflexes     throughout       Lab, Imaging and other studies:  I have personally reviewed pertinent lab results    , CBC:   Lab Results   Component Value Date    WBC 6 09 08/05/2021    HGB 12 7 08/05/2021    HCT 39 8 08/05/2021    MCV 92 08/05/2021     08/05/2021    MCH 29 3 08/05/2021    MCHC 31 9 08/05/2021    RDW 13 5 08/05/2021    MPV 9 7 08/05/2021   , CMP:   Lab Results   Component Value Date    SODIUM 141 08/05/2021    K 3 7 08/05/2021     08/05/2021    CO2 28 08/05/2021    BUN 9 08/05/2021    CREATININE 0 82 08/05/2021    CALCIUM 8 1 (L) 08/05/2021    AST 21 08/05/2021    ALT 24 08/05/2021    ALKPHOS 61 08/05/2021    EGFR 83 08/05/2021       Admitting Diagnosis:  - Small Bowel obstruction    Discharge Diagnosis:  - Small bowel obstruction, resolved    Discharge Instructions:  - Please maintain on a liquid diet for 2-5 days  - Take Acetaminophen around the clock for pain control  - Ambulate daily for a minimum of 15 minutes four times per day  - Follow up With Dr Hillery Duane in 1 week for planning of removal of gastric band  Discharge Disposition: Home    Discharge Medications: See Discharge Medication Reconciliation

## 2021-08-05 NOTE — PROGRESS NOTES
Progress Note - Bariatric Surgery   Conor Ortega 46 y o  female MRN: [id number]  Unit/Bed#: E5 -01 Encounter: [id number]      Subjective/Objective     Subjective:   Patient seen at bedside on rounds this AM  She is doing well wihtout complaints of nausea or vomiting  She is passing flatus  No BM  Yesterday, CT was obtained showing progression of PO contrast to colon  Subsequently, NGT was removed and she has been tolerating sips of clears since removal      Patient denies fevers, chills, sweats, SOB, CP, calf pain  Pain adequately controlled on oral pain medication  Ambulating without assistance, voiding well, and using incentive spirometer  SLIM Consulted yesterday for med management  Objective:    /61   Pulse 60   Temp 97 9 °F (36 6 °C)   Resp 18   Ht 5' 7" (1 702 m)   SpO2 95%   BMI 42 29 kg/m²       Intake/Output Summary (Last 24 hours) at 8/5/2021 1026  Last data filed at 8/5/2021 0936  Gross per 24 hour   Intake --   Output 2750 ml   Net -2750 ml       Invasive Devices     Peripheral Intravenous Line            Peripheral IV 08/03/21 Right Antecubital 1 day                ROS: 10-point system completed  All negative except see HPI  Physical Exam    General Appearance:    Alert, cooperative, no distress, appears stated age   Head:    Normocephalic, without obvious abnormality, atraumatic   Lungs:     Respirations unlabored   Heart:    Regular rate and rhythm   Abdomen:     Soft, appropriate incisional tenderness, no masses, no organomegaly, non-distended  Extremities:   Extremities normal, atraumatic, no cyanosis or edema   Neurologic:  Incision:  Psych:   Normal strength and sensation    Clean, dry, and intact, No bleeding, ecchymosis or cellulitis noted      Normal mood and affect       Lab, Imaging and other studies:  CBC:   Lab Results   Component Value Date    WBC 6 09 08/05/2021    HGB 12 7 08/05/2021    HCT 39 8 08/05/2021    MCV 92 08/05/2021     08/05/2021 MCH 29 3 08/05/2021    MCHC 31 9 08/05/2021    RDW 13 5 08/05/2021    MPV 9 7 08/05/2021   , CMP:   Lab Results   Component Value Date    SODIUM 141 08/05/2021    K 3 7 08/05/2021     08/05/2021    CO2 28 08/05/2021    BUN 9 08/05/2021    CREATININE 0 82 08/05/2021    CALCIUM 8 1 (L) 08/05/2021    AST 21 08/05/2021    ALT 24 08/05/2021    ALKPHOS 61 08/05/2021    EGFR 83 08/05/2021        VTE Mechanical Prophylaxis: sequential compression device    Assessment/Plan  1)  Patient s/p diagnostic lap for SBO, POD 2, now with improvement  - D/C to home on liquid diet  - Continued ambulation  - Incentive Spirometry  - Multimodal pain regimen   - Outpatient f/u in one week  Will plan for elective removal of fastric band  Will continue to monitr symptoms post operatively to determine need for lysis of adhesions  Plan of care was discussed with patient    Care plan discussed with Dr Rinku Cuenca, DO  Bariatric Surgery  8/5/2021  10:26 AM

## 2021-08-05 NOTE — DISCHARGE SUMMARY
Discharge Summary - Edvin Hutchins 46 y o  female MRN: [id number]    Unit/Bed#: E5 -01 Encounter: [id number]    Date of Admission: 8/3/2021     Date of Discharge: 8/5/2021    Pre-Operative Diagnosis: Pre-Op Diagnosis Codes:     * Periumbilical abdominal pain [R10 33]     * Nausea [R11 0]    Post-Operative Diagnosis: Post-Op Diagnosis Codes:     * Periumbilical abdominal pain [R10 33]     * Nausea [R11 0]    Procedures Performed:  Procedure(s):  LAPAROSCOPY , BAND TUBE RELEASE, LYSIS OF ADHESIONS, EGD    Surgeon: Sary Ryan MD    See H & P for full details of admission and Operative Note for full details of operations performed  HPI  47 yo F with history of gastric band presented to the hospital with complaints of periumbilical pain associated vomiting and intolerance to PO intake on 8/3/21  Physical exam showed tenderness to palpation of left mid abdomen  WBC and lactic acid levels were normal  Work up in ED with CT scan revealed a small bowel obstruction secondary to tubing of the port       Hospital Course: The patient was admitted to the hospital and an NGT for gastric decompression was placed  She was taken to the OR for a diagnostic laparoscopy on 8/3/21  Intra-op findings were significant for a small bowel obstruction, with significant adhesive disease  The adhesions were dense, and it was determined that laparoscopic lysis would increase risk to bowel injury  The band  tubing  was transected and we saw that the distal bowel was peristalsing and filling with contents from proximal bowel  We therefore decided to defer lysis of adhesions and monitor the patient clinically post-op  On POD1, a CT with PO contrast confirmed that there was contrast within the colon  The NGT was removed and the patient was started on a clear liquid diet  She was tolerating well, passing flatus, and stable for DC to home   Patient was seen and examined prior to discharge         Physical Exam On Discharge     Blood pressure 119/61, pulse 60, temperature 97 9 °F (36 6 °C), resp  rate 18, height 5' 7" (1 702 m), SpO2 95 %  ,Body mass index is 42 29 kg/m²         Intake/Output Summary (Last 24 hours) at 8/5/2021 1051  Last data filed at 8/5/2021 7420      Gross per 24 hour   Intake --   Output 2650 ml   Net -2650 ml             Invasive Devices            Peripheral Intravenous Line                     Peripheral IV 08/03/21 Right Antecubital 1 day                     Physical Exam: /61   Pulse 60   Temp 97 9 °F (36 6 °C)   Resp 18   Ht 5' 7" (1 702 m)   SpO2 95%   BMI 42 29 kg/m²      General Appearance:    Alert, cooperative, no distress, appears stated age   Head:    Normocephalic, without obvious abnormality, atraumatic   Eyes:    PERRL, conjunctiva/corneas clear, EOM's intact, fundi     benign, both eyes   Ears:    Normal TM's and external ear canals, both ears   Nose:   Nares normal, septum midline, mucosa normal, no drainage    or sinus tenderness   Throat:   Lips, mucosa, and tongue normal; teeth and gums normal   Neck:   Supple, symmetrical, trachea midline, no adenopathy;     thyroid:  no enlargement/tenderness/nodules; no carotid    bruit or JVD   Back:     Symmetric, no curvature, ROM normal, no CVA tenderness   Lungs:     Clear to auscultation bilaterally, respirations unlabored   Chest Wall:    No tenderness or deformity    Heart:    Regular rate and rhythm, S1 and S2 normal, no murmur, rub   or gallop   Breast Exam:    No tenderness, masses, or nipple abnormality   Abdomen:     Soft, non-tender, bowel sounds active all four quadrants,     no masses, no organomegaly   Genitalia:    Normal female without lesion, discharge or tenderness   Rectal:    Normal tone, no masses or tenderness; guaiac negative stool   Extremities:   Extremities normal, atraumatic, no cyanosis or edema   Pulses:   2+ and symmetric all extremities   Skin:   Skin color, texture, turgor normal, no rashes or lesions   Lymph nodes: Cervical, supraclavicular, and axillary nodes normal   Neurologic:   CNII-XII intact, normal strength, sensation and reflexes     throughout         Lab, Imaging and other studies:  I have personally reviewed pertinent lab results  , CBC:         Lab Results   Component Value Date     WBC 6 09 08/05/2021     HGB 12 7 08/05/2021     HCT 39 8 08/05/2021     MCV 92 08/05/2021      08/05/2021     MCH 29 3 08/05/2021     MCHC 31 9 08/05/2021     RDW 13 5 08/05/2021     MPV 9 7 08/05/2021   , CMP:         Lab Results   Component Value Date     SODIUM 141 08/05/2021     K 3 7 08/05/2021      08/05/2021     CO2 28 08/05/2021     BUN 9 08/05/2021     CREATININE 0 82 08/05/2021     CALCIUM 8 1 (L) 08/05/2021     AST 21 08/05/2021     ALT 24 08/05/2021     ALKPHOS 61 08/05/2021     EGFR 83 08/05/2021         Admitting Diagnosis:  - Small Bowel obstruction     Discharge Diagnosis:  - Small bowel obstruction, resolved    Provisions for Follow-Up Care:  See After Visit Summary/Discharge Instructions for information related to follow-up care and home orders  Planned Readmission: No    Discharge Medications:  See After Visit Summary/Discharge Instructions for reconciled discharge medications provided to patient and family  Discharge Instructions:  - Please maintain on a liquid diet for 2-5 days    - Take Acetaminophen around the clock for pain control  - Ambulate daily for a minimum of 15 minutes four times per day  - Follow up With Dr Analisa Garcia in 1 week for planning of removal of gastric band      Discharge Disposition: Home      Winifred Chu PA-C  Bariatric Surgery

## 2021-08-05 NOTE — DISCHARGE INSTRUCTIONS
Bariatric/Weight Loss Surgery  Hospital Discharge Instructions  1  ACTIVITY:  a  Progress as feels comfortable - a good rule is:  if you are doing something and it begins to hurt, stop doing the activity  Walk every hour while at home  b  Vilma Alonso may walk stairs if you do so slowly  c  You may shower 48 hours after surgery  Do not scrub incision sites  Blot gently with clean towel to dry incisions  (see #4 below)   d  Use your incentive spirometer 10 times per hour while awake for 1 week after surgery  e  Do NOT drive for 48 hours after surgery  2  DIET  a  Follow diet instructions as recommended by Dr Hillery Duane    3  MEDICATIONS:  a  The abdominal nerve block will wear off during the first 1-2 days that you are home, and you may become sore (especially over incision site/sites where abdominal wall is sutured)  This may create a pulling sensation, especially while moving around, and will fade over time  Continue to take your Tylenol as instructed  b  Start vitamins and minerals one week after surgery or when you start stage 3/puree diet  c  Anti-acid Medication as per prescription  d  Other medications as indicated on the Physician Patient Discharge Instructions form given to you at the time of discharge  e  Make sure that you are splitting your pill or tablet medications in halves or fourths or even crushing them before you take them  Capsules should be opened and mixed with water or jello  You need to do this for at least 4 weeks after surgery  Eventually you will be able to take your medications the regular way as they were prescribed  Ree Sons will need to consult with your Family Doctor in regards to all your prescribed medication, particularly those for blood pressure and diabetes  As you lose weight, medical conditions may change, requiring an alteration or elimination of the drug dose  Monitor blood pressure closely and call PCP with any concerns     g  DO NOT TAKE BIRTH CONTROL(BC) MEDICATIONS, INSERT BC VAGINAL RINGS, OR PLACE IUD OR ANY OTHER BC METHODS UNTIL 31 DAYS FROM DAY OF DISCHARGE FROM HOSPITAL  THIS PLACES YOU AT HIGH RISK FOR A POTENTIALLY LIFE THREATENING BLOOD CLOT  Remember to always use barrier methods for birth control and speak to your GYN about using two forms of birth control to start 31 days after surgery  It is very important to avoid pregnancy until at least 18-24 months after surgery  4  INCISION CARE  a  You may shower and get incisions wet 2 days after surgery  No soaking tub baths or swimming for 30 days after surgery  Keep abdominal area and incisions clean  Use soap and water to create a good lather and rinse off  Do not scrub incisions  b  If you have a drain, empty the drain as the nurses instructed  5  FOLLOW-UP APPOINTMENT should be made for one week after discharge  Call surgeons office at [phone] to schedule an appointment      6  CALL YOUR DOCTOR FOR:  pain not controlled by pain medications, a temperature greater than 101 5° F, any increase or change in drainage or redness from any incision, any vomiting or inability to keep liquids down, shortness of breath, shoulder pain, or bleeding

## 2021-08-06 ENCOUNTER — TELEPHONE (OUTPATIENT)
Dept: MEDSURG UNIT | Facility: HOSPITAL | Age: 51
End: 2021-08-06

## 2021-08-13 ENCOUNTER — OFFICE VISIT (OUTPATIENT)
Dept: BARIATRICS | Facility: CLINIC | Age: 51
End: 2021-08-13

## 2021-08-13 VITALS
WEIGHT: 263 LBS | HEART RATE: 79 BPM | DIASTOLIC BLOOD PRESSURE: 82 MMHG | HEIGHT: 66 IN | TEMPERATURE: 98.5 F | BODY MASS INDEX: 42.27 KG/M2 | RESPIRATION RATE: 16 BRPM | SYSTOLIC BLOOD PRESSURE: 126 MMHG

## 2021-08-13 DIAGNOSIS — Z98.84 BARIATRIC SURGERY STATUS: ICD-10-CM

## 2021-08-13 DIAGNOSIS — E66.01 MORBID OBESITY (HCC): Chronic | ICD-10-CM

## 2021-08-13 DIAGNOSIS — K56.609 BOWEL OBSTRUCTION (HCC): Primary | ICD-10-CM

## 2021-08-13 DIAGNOSIS — R63.5 ABNORMAL WEIGHT GAIN: ICD-10-CM

## 2021-08-13 DIAGNOSIS — K56.609 SMALL BOWEL OBSTRUCTION (HCC): ICD-10-CM

## 2021-08-13 PROCEDURE — 99024 POSTOP FOLLOW-UP VISIT: CPT | Performed by: SURGERY

## 2021-08-13 NOTE — PROGRESS NOTES
Assessment/Plan:      Diagnoses and all orders for this visit:    Bowel obstruction ([address] )    Morbid obesity ([address] )    Small bowel obstruction ([address] )    Abnormal weight gain    Bariatric surgery status             Subjective:     Patient ID: Marvel Joel is a 46 y o  female  Patient presents for   Chief Complaint   Patient presents with    Post-op     Post-Op Bariatric Surgery Surgery Date 8/3/21, Diagnostic laparoscopy    HPI  45 yo F presents for post op follow up s/p diagnosic laparoscopy for internal hernia from previously placed lap band tubing  During the procedure, the tubing was transected with resolution of obstruction  Gastric band and port components were kept in place  patien has been tolerating a full lquid diet post op without nausea or vomiting  She has no reflux symptoms  She is having bowel movements and passing flatus  Of note, patient states that she has had a weight regain of 120lbs since initial placement of her band many years ago with Dr Levy Tafoya  Review of Systems   Constitutional: Negative  HENT: Negative  Eyes: Negative  Respiratory: Negative  Cardiovascular: Negative  Gastrointestinal: Negative  Endocrine: Negative  Genitourinary: Negative  Musculoskeletal: Negative  Skin: Negative  Allergic/Immunologic: Negative  Neurological: Negative  Hematological: Negative  Psychiatric/Behavioral: Negative  All other systems reviewed and are negative  Objective:     Physical Exam  Constitutional:       Appearance: Normal appearance  HENT:      Head: Normocephalic and atraumatic  Nose: Nose normal       Mouth/Throat:      Mouth: Mucous membranes are moist       Pharynx: Oropharynx is clear  Eyes:      Extraocular Movements: Extraocular movements intact  Pupils: Pupils are equal, round, and reactive to light  Cardiovascular:      Rate and Rhythm: Normal rate and regular rhythm  Pulses: Normal pulses     Pulmonary: Effort: Pulmonary effort is normal    Abdominal:      General: Abdomen is flat  Bowel sounds are normal       Palpations: Abdomen is soft  Comments: Incisions are CDI, well healing wihtout erythema or drainage  Port palpated lateral to LUQ incision   Musculoskeletal:         General: Normal range of motion  Cervical back: Normal range of motion and neck supple  Skin:     General: Skin is warm and dry  Neurological:      General: No focal deficit present  Mental Status: She is alert and oriented to person, place, and time  Mental status is at baseline  Psychiatric:         Mood and Affect: Mood normal          Behavior: Behavior normal          Thought Content: Thought content normal          Judgment: Judgment normal              Discussion and Summary:     47 yo F with resolution of bowel obstruction s/p diagnostic lap with transection of band tubing, POD 9, doing well  - Patient will follow up with medical weight loss team and dieticians  In the interim, she will consider undergoing a second bariatric procedure  We will plan on a single staged procedure with band removal versus a two staged procedure  Patient with BMI of 43, no diabetes or reflux  Given that she also has matted bowel loops from adhesions, she is an excellent candidate for a lap sleeve gastrectomy over a bypass  We will await patient decision regarding a revision bariatric procedure  In the interim she will continue medical follow up

## 2021-08-13 NOTE — PROGRESS NOTES
POST OP UP VISIT - BARIATRIC SURGERY  Mike Triana 46 y o  female MRN: [id number]  Unit/Bed#:  Encounter: [id number]      HPI:  Mike Triana is a 46 y o  female status post  Diagnostic laparoscopy for bowel obstruction  Review of Systems    Historical Information   Past Medical History:   Diagnosis Date    Arthritis     Morbid obesity ([address] ) 8/3/2021     Past Surgical History:   Procedure Laterality Date    ABDOMINAL SURGERY      CHOLECYSTECTOMY      HYSTERECTOMY      LAPAROSCOPIC GASTRIC BANDING      [place] LAP,DIAGNOSTIC ABDOMEN N/A 8/3/2021    Procedure: LAPAROSCOPY , BAND TUBE RELEASE, LYSIS OF ADHESIONS, EGD;  Surgeon: Chuck Maciel MD;  Location: [facility] OR;  Service: Bariatrics     Social History   Social History     Substance and Sexual Activity   Alcohol Use Never     Social History     Substance and Sexual Activity   Drug Use Never     Social History     Tobacco Use   Smoking Status Never Smoker   Smokeless Tobacco Never Used     Family History: non-contributory    Meds/Allergies   all medications and allergies reviewed  No Known Allergies    Objective       Current Vitals:   Blood Pressure: 126/82 (08/13/21 0858)  Pulse: 79 (08/13/21 0858)  Temperature: 98 5 °F (36 9 °C) (08/13/21 0858)  Temp Source: Tympanic (08/13/21 0858)  Respirations: 16 (08/13/21 0858)  Height: 5' 5 75" (167 cm) (08/13/21 0858)  Weight - Scale: 119 kg (263 lb) (08/13/21 0858)      Invasive Devices     None                 Physical Exam  Vitals reviewed  Constitutional:       General: She is not in acute distress  Appearance: She is well-developed  She is not diaphoretic  HENT:      Head: Normocephalic and atraumatic  Right Ear: External ear normal       Left Ear: External ear normal    Eyes:      General: No scleral icterus  Right eye: No discharge  Left eye: No discharge        Conjunctiva/sclera: Conjunctivae normal    Abdominal:      General: Bowel sounds are normal  There is no distension  Palpations: Abdomen is soft  There is no mass  Tenderness: There is no abdominal tenderness  There is no guarding or rebound  Comments: Abdomen is obese  Benign to examination  Incisions are healing well with no evidence of infection  Neurological:      Mental Status: She is alert and oriented to person, place, and time  Psychiatric:         Behavior: Behavior normal          Thought Content: Thought content normal          Judgment: Judgment normal              Pathology, and Other Studies: I have personally reviewed pertinent reports  Assessment/PLAN:    46 y o  female status post laparoscopic adjustable gastric band placed more than 8 years ago by Dr Arcelia Gowers  One year later she underwent a revision of some sort as the patient could not elaborate  She presented to the emergency room in one of our hospitals complaining of abdominal pain  An abdominal CT scan was obtained and revealed dilated loops of small bowel that were being caused most likely by the tubing of the band  She was clinically stable and she was transferred to the [place] [id number] for an elective diagnostic laparoscopy  During the operation we found out a partial small bowel obstruction with dilated loops of bowel secondary to the tubing of the band that was going from the abdominal wall into this conglomerate of dilated loops of jejunum and exiting proximally towards the band in the proximal stomach  There was no evidence of perforation or ischemia but there was a moderate amount of ascites  The band tubing was cut and the obstruction was relieved  Doing well post op  No major issues  She is considering having a revision to convert the band to something else  In the meantime she will enroll in our medical weight management program and will let us know what she decides  Increase physical activity as tolerated and as instructed      Advance diet as instructed by our dietitians today and as indicated in the binder  Follow up in one month as scheduled             Ishan Arzola MD  8/13/2021  9:32 AM

## 2021-10-01 ENCOUNTER — OFFICE VISIT (OUTPATIENT)
Dept: BARIATRICS | Facility: CLINIC | Age: 51
End: 2021-10-01

## 2021-10-01 VITALS — WEIGHT: 272.7 LBS | BODY MASS INDEX: 45.43 KG/M2 | HEIGHT: 65 IN

## 2021-10-01 DIAGNOSIS — E66.01 MORBID OBESITY (HCC): Primary | Chronic | ICD-10-CM

## 2021-10-01 DIAGNOSIS — Z98.84 BARIATRIC SURGERY STATUS: ICD-10-CM

## 2021-10-01 PROCEDURE — RECHECK

## 2021-10-04 ENCOUNTER — OFFICE VISIT (OUTPATIENT)
Dept: BARIATRICS | Facility: CLINIC | Age: 51
End: 2021-10-04

## 2021-10-04 VITALS — WEIGHT: 271.2 LBS | BODY MASS INDEX: 45.13 KG/M2 | HEART RATE: 74 BPM

## 2021-10-04 DIAGNOSIS — E66.01 MORBID OBESITY WITH BMI OF 45.0-49.9, ADULT (HCC): Primary | ICD-10-CM

## 2021-10-04 PROCEDURE — 99214 OFFICE O/P EST MOD 30 MIN: CPT | Performed by: PHYSICIAN ASSISTANT

## 2021-10-04 RX ORDER — PEN NEEDLE, DIABETIC 32 GX 1/6"
NEEDLE, DISPOSABLE MISCELLANEOUS DAILY
Qty: 100 EACH | Refills: 0 | Status: SHIPPED | OUTPATIENT
Start: 2021-10-04

## 2021-10-11 ENCOUNTER — TELEPHONE (OUTPATIENT)
Dept: BARIATRICS | Facility: CLINIC | Age: 51
End: 2021-10-11

## 2021-10-21 ENCOUNTER — TELEPHONE (OUTPATIENT)
Dept: BARIATRICS | Facility: CLINIC | Age: 51
End: 2021-10-21

## 2021-10-21 ENCOUNTER — OFFICE VISIT (OUTPATIENT)
Dept: BARIATRICS | Facility: CLINIC | Age: 51
End: 2021-10-21

## 2021-10-21 VITALS — WEIGHT: 270 LBS | HEIGHT: 65 IN | BODY MASS INDEX: 44.98 KG/M2

## 2021-10-21 DIAGNOSIS — R63.5 ABNORMAL WEIGHT GAIN: ICD-10-CM

## 2021-10-21 PROCEDURE — WMPRO12

## 2021-10-21 PROCEDURE — RECHECK

## 2021-11-01 ENCOUNTER — IMMUNIZATIONS (OUTPATIENT)
Dept: FAMILY MEDICINE CLINIC | Facility: HOSPITAL | Age: 51
End: 2021-11-01

## 2021-11-01 ENCOUNTER — TELEPHONE (OUTPATIENT)
Dept: BARIATRICS | Facility: CLINIC | Age: 51
End: 2021-11-01

## 2021-11-01 DIAGNOSIS — Z23 ENCOUNTER FOR IMMUNIZATION: Primary | ICD-10-CM

## 2021-11-04 ENCOUNTER — CLINICAL SUPPORT (OUTPATIENT)
Dept: BARIATRICS | Facility: CLINIC | Age: 51
End: 2021-11-04

## 2021-11-04 VITALS — WEIGHT: 270 LBS | BODY MASS INDEX: 44.98 KG/M2 | HEIGHT: 65 IN

## 2021-11-04 DIAGNOSIS — R63.5 ABNORMAL WEIGHT GAIN: Primary | ICD-10-CM

## 2021-11-04 PROCEDURE — RECHECK

## 2021-11-11 ENCOUNTER — CLINICAL SUPPORT (OUTPATIENT)
Dept: BARIATRICS | Facility: CLINIC | Age: 51
End: 2021-11-11

## 2021-11-11 VITALS — WEIGHT: 267.4 LBS | BODY MASS INDEX: 44.55 KG/M2 | HEIGHT: 65 IN

## 2021-11-11 DIAGNOSIS — R63.5 ABNORMAL WEIGHT GAIN: Primary | ICD-10-CM

## 2021-11-11 PROCEDURE — RECHECK

## 2021-11-18 ENCOUNTER — CLINICAL SUPPORT (OUTPATIENT)
Dept: BARIATRICS | Facility: CLINIC | Age: 51
End: 2021-11-18

## 2021-11-18 VITALS — HEIGHT: 65 IN | WEIGHT: 263.8 LBS | BODY MASS INDEX: 43.95 KG/M2

## 2021-11-18 DIAGNOSIS — R63.5 ABNORMAL WEIGHT GAIN: Primary | ICD-10-CM

## 2021-11-18 PROCEDURE — RECHECK

## 2021-12-03 ENCOUNTER — OFFICE VISIT (OUTPATIENT)
Dept: BARIATRICS | Facility: CLINIC | Age: 51
End: 2021-12-03
Payer: COMMERCIAL

## 2021-12-03 VITALS
BODY MASS INDEX: 43.9 KG/M2 | DIASTOLIC BLOOD PRESSURE: 82 MMHG | WEIGHT: 263.5 LBS | TEMPERATURE: 96.4 F | HEIGHT: 65 IN | HEART RATE: 83 BPM | SYSTOLIC BLOOD PRESSURE: 118 MMHG

## 2021-12-03 DIAGNOSIS — R10.9 ABDOMINAL PAIN: Primary | ICD-10-CM

## 2021-12-03 PROCEDURE — 99213 OFFICE O/P EST LOW 20 MIN: CPT | Performed by: SURGERY

## 2021-12-03 RX ORDER — FUROSEMIDE 20 MG/1
20 TABLET ORAL AS NEEDED
COMMUNITY
Start: 2021-09-22

## 2021-12-07 DIAGNOSIS — E66.01 MORBID OBESITY WITH BMI OF 45.0-49.9, ADULT (HCC): Primary | ICD-10-CM

## 2021-12-14 ENCOUNTER — TELEPHONE (OUTPATIENT)
Dept: BARIATRICS | Facility: CLINIC | Age: 51
End: 2021-12-14

## 2021-12-15 ENCOUNTER — TELEPHONE (OUTPATIENT)
Dept: BARIATRICS | Facility: CLINIC | Age: 51
End: 2021-12-15

## 2021-12-16 ENCOUNTER — OFFICE VISIT (OUTPATIENT)
Dept: BARIATRICS | Facility: CLINIC | Age: 51
End: 2021-12-16
Payer: COMMERCIAL

## 2021-12-16 VITALS
BODY MASS INDEX: 44.15 KG/M2 | RESPIRATION RATE: 20 BRPM | SYSTOLIC BLOOD PRESSURE: 122 MMHG | TEMPERATURE: 98.4 F | HEART RATE: 78 BPM | DIASTOLIC BLOOD PRESSURE: 76 MMHG | HEIGHT: 65 IN | WEIGHT: 265 LBS

## 2021-12-16 DIAGNOSIS — E66.01 MORBID OBESITY WITH BMI OF 45.0-49.9, ADULT (HCC): Primary | ICD-10-CM

## 2021-12-16 PROCEDURE — 99214 OFFICE O/P EST MOD 30 MIN: CPT | Performed by: PHYSICIAN ASSISTANT

## 2021-12-21 ENCOUNTER — ANESTHESIA EVENT (OUTPATIENT)
Dept: PERIOP | Facility: HOSPITAL | Age: 51
End: 2021-12-21
Payer: COMMERCIAL

## 2021-12-21 RX ORDER — IBUPROFEN 200 MG
TABLET ORAL EVERY 6 HOURS PRN
COMMUNITY

## 2021-12-22 ENCOUNTER — CONSULT (OUTPATIENT)
Dept: CARDIOLOGY CLINIC | Facility: CLINIC | Age: 51
End: 2021-12-22
Payer: COMMERCIAL

## 2021-12-22 VITALS
DIASTOLIC BLOOD PRESSURE: 72 MMHG | HEIGHT: 65 IN | HEART RATE: 88 BPM | OXYGEN SATURATION: 95 % | BODY MASS INDEX: 43.32 KG/M2 | SYSTOLIC BLOOD PRESSURE: 120 MMHG | WEIGHT: 260 LBS

## 2021-12-22 DIAGNOSIS — E66.01 MORBID OBESITY WITH BMI OF 40.0-44.9, ADULT (HCC): ICD-10-CM

## 2021-12-22 DIAGNOSIS — Z01.810 PRE-OPERATIVE CARDIOVASCULAR EXAMINATION: ICD-10-CM

## 2021-12-22 PROCEDURE — 93000 ELECTROCARDIOGRAM COMPLETE: CPT | Performed by: INTERNAL MEDICINE

## 2021-12-22 PROCEDURE — 99203 OFFICE O/P NEW LOW 30 MIN: CPT | Performed by: INTERNAL MEDICINE

## 2021-12-23 ENCOUNTER — OFFICE VISIT (OUTPATIENT)
Dept: BARIATRICS | Facility: CLINIC | Age: 51
End: 2021-12-23
Payer: COMMERCIAL

## 2021-12-23 VITALS
HEIGHT: 65 IN | HEART RATE: 94 BPM | WEIGHT: 255.5 LBS | TEMPERATURE: 96.8 F | DIASTOLIC BLOOD PRESSURE: 82 MMHG | SYSTOLIC BLOOD PRESSURE: 122 MMHG | BODY MASS INDEX: 42.57 KG/M2

## 2021-12-23 DIAGNOSIS — K56.609 SMALL BOWEL OBSTRUCTION (HCC): ICD-10-CM

## 2021-12-23 DIAGNOSIS — E66.01 MORBID OBESITY WITH BMI OF 40.0-44.9, ADULT (HCC): Primary | ICD-10-CM

## 2021-12-23 DIAGNOSIS — R10.84 GENERALIZED ABDOMINAL PAIN: ICD-10-CM

## 2021-12-23 DIAGNOSIS — Z98.84 HX OF LAPAROSCOPIC ADJUSTABLE GASTRIC BANDING: ICD-10-CM

## 2021-12-23 PROCEDURE — 99213 OFFICE O/P EST LOW 20 MIN: CPT | Performed by: SURGERY

## 2021-12-23 RX ORDER — SCOLOPAMINE TRANSDERMAL SYSTEM 1 MG/1
1 PATCH, EXTENDED RELEASE TRANSDERMAL ONCE
Status: CANCELLED | OUTPATIENT
Start: 2021-12-28 | End: 2021-12-23

## 2021-12-23 RX ORDER — GABAPENTIN 300 MG/1
300 CAPSULE ORAL ONCE
Status: CANCELLED | OUTPATIENT
Start: 2021-12-28 | End: 2021-12-23

## 2021-12-23 RX ORDER — HEPARIN SODIUM 5000 [USP'U]/ML
5000 INJECTION, SOLUTION INTRAVENOUS; SUBCUTANEOUS
Status: CANCELLED | OUTPATIENT
Start: 2021-12-28 | End: 2021-12-29

## 2021-12-23 RX ORDER — ACETAMINOPHEN 325 MG/1
975 TABLET ORAL ONCE
Status: CANCELLED | OUTPATIENT
Start: 2021-12-28 | End: 2021-12-23

## 2021-12-23 RX ORDER — CELECOXIB 200 MG/1
200 CAPSULE ORAL ONCE
Status: CANCELLED | OUTPATIENT
Start: 2021-12-28 | End: 2021-12-23

## 2021-12-23 RX ORDER — OMEPRAZOLE 20 MG/1
20 CAPSULE, DELAYED RELEASE ORAL DAILY
Qty: 30 CAPSULE | Refills: 3 | Status: SHIPPED | OUTPATIENT
Start: 2021-12-29 | End: 2022-01-28

## 2021-12-23 RX ORDER — OXYCODONE HYDROCHLORIDE 5 MG/1
5 TABLET ORAL EVERY 4 HOURS PRN
Qty: 10 TABLET | Refills: 0 | Status: SHIPPED | OUTPATIENT
Start: 2021-12-29

## 2021-12-23 RX ORDER — CEFAZOLIN SODIUM 2 G/50ML
2000 SOLUTION INTRAVENOUS ONCE
Status: CANCELLED | OUTPATIENT
Start: 2021-12-28 | End: 2021-12-23

## 2021-12-28 ENCOUNTER — HOSPITAL ENCOUNTER (OUTPATIENT)
Facility: HOSPITAL | Age: 51
Setting detail: OUTPATIENT SURGERY
Discharge: HOME | End: 2021-12-28
Attending: SURGERY | Admitting: SURGERY
Payer: COMMERCIAL

## 2021-12-28 ENCOUNTER — ANESTHESIA (OUTPATIENT)
Dept: PERIOP | Facility: HOSPITAL | Age: 51
End: 2021-12-28
Payer: COMMERCIAL

## 2021-12-28 VITALS
RESPIRATION RATE: 16 BRPM | DIASTOLIC BLOOD PRESSURE: 67 MMHG | HEART RATE: 64 BPM | SYSTOLIC BLOOD PRESSURE: 125 MMHG | WEIGHT: 256.39 LBS | TEMPERATURE: 97.7 F | BODY MASS INDEX: 42.72 KG/M2 | HEIGHT: 65 IN | OXYGEN SATURATION: 99 %

## 2021-12-28 DIAGNOSIS — R10.9 ABDOMINAL PAIN: ICD-10-CM

## 2021-12-28 DIAGNOSIS — Z98.84 STATUS POST BARIATRIC SURGERY: ICD-10-CM

## 2021-12-28 DIAGNOSIS — Z98.84 HX OF LAPAROSCOPIC ADJUSTABLE GASTRIC BANDING: Primary | ICD-10-CM

## 2021-12-28 PROCEDURE — 88307 TISSUE EXAM BY PATHOLOGIST: CPT | Performed by: PATHOLOGY

## 2021-12-28 PROCEDURE — 88300 SURGICAL PATH GROSS: CPT | Performed by: PATHOLOGY

## 2021-12-28 PROCEDURE — 99024 POSTOP FOLLOW-UP VISIT: CPT | Performed by: SURGERY

## 2021-12-28 PROCEDURE — 43774 LAP RMVL GASTR ADJ ALL PARTS: CPT | Performed by: STUDENT IN AN ORGANIZED HEALTH CARE EDUCATION/TRAINING PROGRAM

## 2021-12-28 PROCEDURE — C9290 INJ, BUPIVACAINE LIPOSOME: HCPCS | Performed by: SURGERY

## 2021-12-28 PROCEDURE — 43774 LAP RMVL GASTR ADJ ALL PARTS: CPT | Performed by: SURGERY

## 2021-12-28 RX ORDER — HYDROMORPHONE HCL/PF 1 MG/ML
0.5 SYRINGE (ML) INJECTION
Status: DISCONTINUED | OUTPATIENT
Start: 2021-12-28 | End: 2021-12-28 | Stop reason: HOSPADM

## 2021-12-28 RX ORDER — SODIUM CHLORIDE 9 MG/ML
INJECTION, SOLUTION INTRAVENOUS AS NEEDED
Status: DISCONTINUED | OUTPATIENT
Start: 2021-12-28 | End: 2021-12-28 | Stop reason: HOSPADM

## 2021-12-28 RX ORDER — LIDOCAINE HYDROCHLORIDE 20 MG/ML
INJECTION, SOLUTION EPIDURAL; INFILTRATION; INTRACAUDAL; PERINEURAL AS NEEDED
Status: DISCONTINUED | OUTPATIENT
Start: 2021-12-28 | End: 2021-12-28

## 2021-12-28 RX ORDER — ONDANSETRON 2 MG/ML
INJECTION INTRAMUSCULAR; INTRAVENOUS AS NEEDED
Status: DISCONTINUED | OUTPATIENT
Start: 2021-12-28 | End: 2021-12-28

## 2021-12-28 RX ORDER — BUPIVACAINE HYDROCHLORIDE 5 MG/ML
INJECTION, SOLUTION EPIDURAL; INTRACAUDAL AS NEEDED
Status: DISCONTINUED | OUTPATIENT
Start: 2021-12-28 | End: 2021-12-28 | Stop reason: HOSPADM

## 2021-12-28 RX ORDER — GLYCOPYRROLATE 0.2 MG/ML
INJECTION INTRAMUSCULAR; INTRAVENOUS AS NEEDED
Status: DISCONTINUED | OUTPATIENT
Start: 2021-12-28 | End: 2021-12-28

## 2021-12-28 RX ORDER — MORPHINE SULFATE 4 MG/ML
4 INJECTION, SOLUTION INTRAMUSCULAR; INTRAVENOUS EVERY 4 HOURS PRN
Status: DISCONTINUED | OUTPATIENT
Start: 2021-12-28 | End: 2021-12-28 | Stop reason: HOSPADM

## 2021-12-28 RX ORDER — KETOROLAC TROMETHAMINE 30 MG/ML
INJECTION, SOLUTION INTRAMUSCULAR; INTRAVENOUS AS NEEDED
Status: DISCONTINUED | OUTPATIENT
Start: 2021-12-28 | End: 2021-12-28

## 2021-12-28 RX ORDER — OXYCODONE HYDROCHLORIDE 10 MG/1
10 TABLET ORAL EVERY 4 HOURS PRN
Status: DISCONTINUED | OUTPATIENT
Start: 2021-12-28 | End: 2021-12-28 | Stop reason: HOSPADM

## 2021-12-28 RX ORDER — ACETAMINOPHEN 325 MG/1
975 TABLET ORAL ONCE
Status: COMPLETED | OUTPATIENT
Start: 2021-12-28 | End: 2021-12-28

## 2021-12-28 RX ORDER — FENTANYL CITRATE 50 UG/ML
INJECTION, SOLUTION INTRAMUSCULAR; INTRAVENOUS AS NEEDED
Status: DISCONTINUED | OUTPATIENT
Start: 2021-12-28 | End: 2021-12-28

## 2021-12-28 RX ORDER — CELECOXIB 200 MG/1
200 CAPSULE ORAL ONCE
Status: COMPLETED | OUTPATIENT
Start: 2021-12-28 | End: 2021-12-28

## 2021-12-28 RX ORDER — OXYCODONE HYDROCHLORIDE 5 MG/1
5 TABLET ORAL EVERY 4 HOURS PRN
Status: DISCONTINUED | OUTPATIENT
Start: 2021-12-28 | End: 2021-12-28 | Stop reason: HOSPADM

## 2021-12-28 RX ORDER — MEPERIDINE HYDROCHLORIDE 25 MG/ML
12.5 INJECTION INTRAMUSCULAR; INTRAVENOUS; SUBCUTANEOUS
Status: DISCONTINUED | OUTPATIENT
Start: 2021-12-28 | End: 2021-12-28 | Stop reason: HOSPADM

## 2021-12-28 RX ORDER — SODIUM CHLORIDE, SODIUM LACTATE, POTASSIUM CHLORIDE, CALCIUM CHLORIDE 600; 310; 30; 20 MG/100ML; MG/100ML; MG/100ML; MG/100ML
125 INJECTION, SOLUTION INTRAVENOUS CONTINUOUS
Status: DISCONTINUED | OUTPATIENT
Start: 2021-12-28 | End: 2021-12-28 | Stop reason: HOSPADM

## 2021-12-28 RX ORDER — DEXAMETHASONE SODIUM PHOSPHATE 4 MG/ML
INJECTION, SOLUTION INTRA-ARTICULAR; INTRALESIONAL; INTRAMUSCULAR; INTRAVENOUS; SOFT TISSUE AS NEEDED
Status: DISCONTINUED | OUTPATIENT
Start: 2021-12-28 | End: 2021-12-28

## 2021-12-28 RX ORDER — MAGNESIUM HYDROXIDE 1200 MG/15ML
LIQUID ORAL AS NEEDED
Status: DISCONTINUED | OUTPATIENT
Start: 2021-12-28 | End: 2021-12-28 | Stop reason: HOSPADM

## 2021-12-28 RX ORDER — ONDANSETRON 2 MG/ML
4 INJECTION INTRAMUSCULAR; INTRAVENOUS EVERY 6 HOURS PRN
Status: DISCONTINUED | OUTPATIENT
Start: 2021-12-28 | End: 2021-12-28 | Stop reason: HOSPADM

## 2021-12-28 RX ORDER — CEFAZOLIN SODIUM 2 G/50ML
2000 SOLUTION INTRAVENOUS ONCE
Status: COMPLETED | OUTPATIENT
Start: 2021-12-28 | End: 2021-12-28

## 2021-12-28 RX ORDER — SCOLOPAMINE TRANSDERMAL SYSTEM 1 MG/1
1 PATCH, EXTENDED RELEASE TRANSDERMAL ONCE
Status: DISCONTINUED | OUTPATIENT
Start: 2021-12-28 | End: 2021-12-28 | Stop reason: HOSPADM

## 2021-12-28 RX ORDER — PROPOFOL 10 MG/ML
INJECTION, EMULSION INTRAVENOUS AS NEEDED
Status: DISCONTINUED | OUTPATIENT
Start: 2021-12-28 | End: 2021-12-28

## 2021-12-28 RX ORDER — ROCURONIUM BROMIDE 10 MG/ML
INJECTION, SOLUTION INTRAVENOUS AS NEEDED
Status: DISCONTINUED | OUTPATIENT
Start: 2021-12-28 | End: 2021-12-28

## 2021-12-28 RX ORDER — ONDANSETRON 2 MG/ML
4 INJECTION INTRAMUSCULAR; INTRAVENOUS ONCE AS NEEDED
Status: DISCONTINUED | OUTPATIENT
Start: 2021-12-28 | End: 2021-12-28 | Stop reason: HOSPADM

## 2021-12-28 RX ORDER — FENTANYL CITRATE/PF 50 MCG/ML
25 SYRINGE (ML) INJECTION
Status: DISCONTINUED | OUTPATIENT
Start: 2021-12-28 | End: 2021-12-28 | Stop reason: HOSPADM

## 2021-12-28 RX ORDER — NEOSTIGMINE METHYLSULFATE 1 MG/ML
INJECTION INTRAVENOUS AS NEEDED
Status: DISCONTINUED | OUTPATIENT
Start: 2021-12-28 | End: 2021-12-28

## 2021-12-28 RX ORDER — ACETAMINOPHEN 160 MG/5ML
975 SUSPENSION, ORAL (FINAL DOSE FORM) ORAL EVERY 8 HOURS
Qty: 638.4 ML | Refills: 0 | Status: SHIPPED | OUTPATIENT
Start: 2021-12-28 | End: 2022-01-04

## 2021-12-28 RX ORDER — HEPARIN SODIUM 5000 [USP'U]/ML
5000 INJECTION, SOLUTION INTRAVENOUS; SUBCUTANEOUS
Status: COMPLETED | OUTPATIENT
Start: 2021-12-28 | End: 2021-12-28

## 2021-12-28 RX ORDER — GABAPENTIN 300 MG/1
300 CAPSULE ORAL ONCE
Status: COMPLETED | OUTPATIENT
Start: 2021-12-28 | End: 2021-12-28

## 2021-12-28 RX ORDER — MIDAZOLAM HYDROCHLORIDE 2 MG/2ML
INJECTION, SOLUTION INTRAMUSCULAR; INTRAVENOUS AS NEEDED
Status: DISCONTINUED | OUTPATIENT
Start: 2021-12-28 | End: 2021-12-28

## 2021-12-28 RX ADMIN — LIDOCAINE HYDROCHLORIDE 100 MG: 20 INJECTION, SOLUTION EPIDURAL; INFILTRATION; INTRACAUDAL; PERINEURAL at 07:30

## 2021-12-28 RX ADMIN — SODIUM CHLORIDE, SODIUM LACTATE, POTASSIUM CHLORIDE, AND CALCIUM CHLORIDE: .6; .31; .03; .02 INJECTION, SOLUTION INTRAVENOUS at 08:16

## 2021-12-28 RX ADMIN — ROCURONIUM BROMIDE 50 MG: 50 INJECTION, SOLUTION INTRAVENOUS at 07:30

## 2021-12-28 RX ADMIN — GLYCOPYRROLATE 0.6 MG: 0.2 INJECTION, SOLUTION INTRAMUSCULAR; INTRAVENOUS at 08:39

## 2021-12-28 RX ADMIN — FENTANYL CITRATE 100 MCG: 50 INJECTION INTRAMUSCULAR; INTRAVENOUS at 07:30

## 2021-12-28 RX ADMIN — FENTANYL CITRATE 100 MCG: 50 INJECTION INTRAMUSCULAR; INTRAVENOUS at 07:57

## 2021-12-28 RX ADMIN — NEOSTIGMINE METHYLSULFATE 4 MG: 1 INJECTION INTRAVENOUS at 08:39

## 2021-12-28 RX ADMIN — CEFAZOLIN SODIUM 2000 MG: 2 SOLUTION INTRAVENOUS at 07:18

## 2021-12-28 RX ADMIN — SCOPALAMINE 1 PATCH: 1 PATCH, EXTENDED RELEASE TRANSDERMAL at 05:48

## 2021-12-28 RX ADMIN — CELECOXIB 200 MG: 200 CAPSULE ORAL at 05:48

## 2021-12-28 RX ADMIN — PROPOFOL 200 MG: 10 INJECTION, EMULSION INTRAVENOUS at 07:30

## 2021-12-28 RX ADMIN — KETOROLAC TROMETHAMINE 30 MG: 30 INJECTION, SOLUTION INTRAMUSCULAR at 08:39

## 2021-12-28 RX ADMIN — GABAPENTIN 300 MG: 300 CAPSULE ORAL at 05:48

## 2021-12-28 RX ADMIN — SODIUM CHLORIDE, SODIUM LACTATE, POTASSIUM CHLORIDE, AND CALCIUM CHLORIDE 125 ML/HR: .6; .31; .03; .02 INJECTION, SOLUTION INTRAVENOUS at 05:59

## 2021-12-28 RX ADMIN — HEPARIN SODIUM 5000 UNITS: 5000 INJECTION INTRAVENOUS; SUBCUTANEOUS at 06:00

## 2021-12-28 RX ADMIN — ONDANSETRON 4 MG: 2 INJECTION INTRAMUSCULAR; INTRAVENOUS at 08:39

## 2021-12-28 RX ADMIN — DEXAMETHASONE SODIUM PHOSPHATE 4 MG: 4 INJECTION INTRA-ARTICULAR; INTRALESIONAL; INTRAMUSCULAR; INTRAVENOUS; SOFT TISSUE at 07:30

## 2021-12-28 RX ADMIN — ACETAMINOPHEN 975 MG: 325 TABLET, FILM COATED ORAL at 05:48

## 2021-12-28 RX ADMIN — MIDAZOLAM 2 MG: 1 INJECTION INTRAMUSCULAR; INTRAVENOUS at 07:22

## 2022-01-03 ENCOUNTER — TELEPHONE (OUTPATIENT)
Dept: BARIATRICS | Facility: CLINIC | Age: 52
End: 2022-01-03

## 2022-01-03 NOTE — TELEPHONE ENCOUNTER
Patient called the office today in regards to her incision  Patient stated she has concern about her incision being infected because there is about 2 inches of redness around it but she stated that she does not have fever and does not have any pain at this time  Patient stated that there is no discharge from the incision but it is warm to touch  Asked her if she can send a picture of it through [facility] but she stated she is having log in issues  Patient stated she will show him at her upcoming appointment tomorrow with Dr Osiel Velasquez

## 2022-01-04 ENCOUNTER — OFFICE VISIT (OUTPATIENT)
Dept: BARIATRICS | Facility: CLINIC | Age: 52
End: 2022-01-04

## 2022-01-04 VITALS
HEIGHT: 65 IN | HEART RATE: 76 BPM | BODY MASS INDEX: 43.57 KG/M2 | TEMPERATURE: 97.3 F | WEIGHT: 261.5 LBS | DIASTOLIC BLOOD PRESSURE: 80 MMHG | SYSTOLIC BLOOD PRESSURE: 130 MMHG

## 2022-01-04 DIAGNOSIS — T14.8XXA HEMATOMA: ICD-10-CM

## 2022-01-04 DIAGNOSIS — Z98.84 BARIATRIC SURGERY STATUS: Primary | ICD-10-CM

## 2022-01-04 DIAGNOSIS — E66.01 MORBID (SEVERE) OBESITY DUE TO EXCESS CALORIES (HCC): ICD-10-CM

## 2022-01-04 PROCEDURE — 99024 POSTOP FOLLOW-UP VISIT: CPT | Performed by: SURGERY

## 2022-01-04 RX ORDER — CEPHALEXIN 500 MG/1
500 CAPSULE ORAL 4 TIMES DAILY
Qty: 28 CAPSULE | Refills: 0 | Status: SHIPPED | OUTPATIENT
Start: 2022-01-05 | End: 2022-01-12

## 2022-01-04 RX ORDER — ACETAMINOPHEN 160 MG/5ML
LIQUID ORAL
COMMUNITY
Start: 2021-12-28

## 2022-01-04 NOTE — PROGRESS NOTES
POST OP UP VISIT - BARIATRIC SURGERY  Curtis Kehr 46 y o  female MRN: [id number]  Unit/Bed#:  Encounter: [id number]      HPI:  Curtis Kehr is a 46 y o  female status post Diagnostic Laparoscopy, Extensive Lysis of Adhesions, Laparoscopic Removal of Adjustable Gastric Band, Intraoperative Endoscopy, and Gastric Band Port Explantation performed by Dr Dory Ely on 12/28/21 returning to office for first post op visit since surgery  Tolerating regular diet  Denies nausea and vomiting  Taking multivitamins and PPI daily  Patient notes redness around left incision started two days ago and has been slowly spreading  She denies fever or any discharge from any incisions  Patient did have chills early after surgery  She does note pain over left-sided incision  Intraop Findings: Multiple dense adhesions involving the band capsule to the stomach and undersurface of the liver      Review of Systems   Constitutional: Negative for chills and fever  HENT: Negative for ear pain and sore throat  Eyes: Negative for pain and visual disturbance  Respiratory: Negative for cough and shortness of breath  Cardiovascular: Negative for chest pain and palpitations  Gastrointestinal: Positive for abdominal pain  Negative for diarrhea, nausea and vomiting  Genitourinary: Negative for dysuria and hematuria  Musculoskeletal: Negative for arthralgias and back pain  Skin: Negative for color change and rash  Neurological: Negative for seizures and syncope  All other systems reviewed and are negative        Historical Information   Past Medical History:   Diagnosis Date    Arthritis     left knee    GERD (gastroesophageal reflux disease)     Hypertension     Morbid obesity [address] [address] ) 8/3/2021    Wears glasses      Past Surgical History:   Procedure Laterality Date    ABDOMINAL SURGERY      CHOLECYSTECTOMY      COLOGUARD (HISTORICAL)      HYSTERECTOMY      LAPAROSCOPIC GASTRIC BANDING      [place] LAP,DIAGNOSTIC ABDOMEN N/A 8/3/2021    Procedure: LAPAROSCOPY , BAND TUBE RELEASE, LYSIS OF ADHESIONS, EGD;  Surgeon: Mary Cole MD;  Location: [facility];  Service: Bariatrics    REMOVAL GASTRIC BAND LAPAROSCOPIC N/A 12/28/2021    Procedure: LAP BAND REMOVAL, intraop egd;  Surgeon: Mary Cole MD;  Location[facility];  Service: Bariatrics     Social History   Social History     Substance and Sexual Activity   Alcohol Use Yes    Comment: once a week if that     Social History     Substance and Sexual Activity   Drug Use Never     Social History     Tobacco Use   Smoking Status Never Smoker   Smokeless Tobacco Never Used     Family History:   Family History   Problem Relation Age of Onset    Diabetes Mother     Hypertension Mother     Hypertension Father     No Known Problems Sister     Cancer Maternal Grandmother         breast    No Known Problems Maternal Grandfather     No Known Problems Paternal Grandmother     No Known Problems Paternal Grandfather     No Known Problems Sister     No Known Problems Sister     Lung cancer Sister     No Known Problems Sister     No Known Problems Maternal Aunt     Alcohol abuse Neg Hx     Arthritis Neg Hx     Asthma Neg Hx     Birth defects Neg Hx     COPD Neg Hx     Depression Neg Hx     Drug abuse Neg Hx     Early death Neg Hx     Heart disease Neg Hx     Hyperlipidemia Neg Hx     Hearing loss Neg Hx     Kidney disease Neg Hx     Learning disabilities Neg Hx     Mental illness Neg Hx     Mental retardation Neg Hx     Miscarriages / Stillbirths Neg Hx     Stroke Neg Hx     Vision loss Neg Hx        Meds/Allergies   all medications and allergies reviewed  No Known Allergies    Objective       Current Vitals:   Blood Pressure: 130/80 (01/04/22 1317)  Pulse: 76 (01/04/22 1317)  Temperature: (!) 97 3 °F (36 3 °C) (01/04/22 1317)  Temp Source: Tympanic (01/04/22 1317)  Height: 5' 5" (165 1 cm) (01/04/22 1317)  Weight - Scale: 119 kg (261 lb 8 oz) (01/04/22 1317)      Invasive Devices  Report    None                 Physical Exam  Constitutional:       Appearance: Normal appearance  She is obese  HENT:      Head: Normocephalic and atraumatic  Nose: Nose normal       Mouth/Throat:      Mouth: Mucous membranes are moist    Eyes:      Extraocular Movements: Extraocular movements intact  Pupils: Pupils are equal, round, and reactive to light  Cardiovascular:      Rate and Rhythm: Normal rate and regular rhythm  Pulses: Normal pulses  Heart sounds: Normal heart sounds  Pulmonary:      Effort: Pulmonary effort is normal       Breath sounds: Normal breath sounds  Abdominal:      Palpations: Abdomen is soft  Comments: Incisions healing with small area of erythema from skin glue around all incisions, left lower incision where port explanted erythematous and warm to touch, left central lower and upper incisions slightly open with no drainage, no drainage from any incision with milking, palpable fluctuance around left sided erythematous incision   Musculoskeletal:      Cervical back: Normal range of motion  Skin:     General: Skin is warm  Neurological:      General: No focal deficit present  Mental Status: She is alert and oriented to person, place, and time  Psychiatric:         Mood and Affect: Mood normal          Behavior: Behavior normal          Assessment/PLAN:    46 y o  female status post Diagnostic Laparoscopy, Extensive Lysis of Adhesions, Laparoscopic Removal of Adjustable Gastric Band, Intraoperative Endoscopy, and Gastric Band Port Explantation done on 12/28/21 by Dr Azam Mayorga, doing well post op  Mild erythematous circles around all incisions from sensitivity to skin glue  Palpable fluctance around left-sided incision where port explanted, likely hematoma/seroma  Patient denies fever   demarkation [place] around area of concern around left sided incision with skin marker   Small are of wound opened and attempted expression today in office with slight nonpurulent, bloody drainage  Wound packed with packing strip  Patient tolerated well  Patient to remove packing tomorrow after showering and cover with clean gauze  Prescribed Keflex 500mg TID x 7 days to patient's pharmacy, but was told not to  until seen on Friday or sooner if area worsens  Patient to call with any fever or worsening  The pathology report is pending  Pathology, and Other Studies: I have personally reviewed pertinent reports  Increase physical activity slowly as tolerated and instructed  Continue PPI    Follow up on Friday to recheck incision       Patient plans to return to work on 1/15, will reevaluate at follow up          Verena Guido PA-C  Bariatric Surgery   1/4/2022  2:00 PM

## 2022-01-07 ENCOUNTER — OFFICE VISIT (OUTPATIENT)
Dept: BARIATRICS | Facility: CLINIC | Age: 52
End: 2022-01-07

## 2022-01-07 VITALS
DIASTOLIC BLOOD PRESSURE: 88 MMHG | WEIGHT: 263.5 LBS | SYSTOLIC BLOOD PRESSURE: 132 MMHG | HEART RATE: 86 BPM | BODY MASS INDEX: 43.9 KG/M2 | HEIGHT: 65 IN | TEMPERATURE: 97.7 F

## 2022-01-07 DIAGNOSIS — Z98.84 BARIATRIC SURGERY STATUS: Primary | ICD-10-CM

## 2022-01-07 PROCEDURE — WMFIBER: Performed by: SURGERY

## 2022-01-07 PROCEDURE — 99024 POSTOP FOLLOW-UP VISIT: CPT | Performed by: SURGERY

## 2022-01-07 NOTE — PROGRESS NOTES
Patient ID: Willy Patel is a 46 y o  female  Subjective:      46 y o  female  s/p status post Diagnostic Laparoscopy, Extensive Lysis of Adhesions, Laparoscopic Removal of Adjustable Gastric Band, Intraoperative Endoscopy, and Gastric Band Port Explantation done on 12/28/21 by Dr Crista Coles presents to the office today for wound check  Patient states her wound was improving in redness  It was not expanding past the area of demarcation  She had no fevers  Regardless, she did start antibiotics  The wound has continued improvement in redness  Historical Information   Past Medical History:   Diagnosis Date    Arthritis     left knee    GERD (gastroesophageal reflux disease)     Hypertension     Morbid obesity ([address] ) 8/3/2021    Wears glasses      Past Surgical History:   Procedure Laterality Date    ABDOMINAL SURGERY      CHOLECYSTECTOMY      COLOGUARD (HISTORICAL)      HYSTERECTOMY      LAPAROSCOPIC GASTRIC BANDING      MT LAP,DIAGNOSTIC ABDOMEN N/A 8/3/2021    Procedure: LAPAROSCOPY , BAND TUBE RELEASE, LYSIS OF ADHESIONS, EGD;  Surgeon: Farhad Saenz MD;  Location: [facility];  Service: Bariatrics    REMOVAL GASTRIC BAND LAPAROSCOPIC N/A 12/28/2021    Procedure: LAP BAND REMOVAL, intraop egd;  Surgeon: Farhad Saenz MD;  Location: [facility];  Service: Bariatrics     Social History   Social History     Substance and Sexual Activity   Alcohol Use Yes    Comment: once a week if that     Social History     Substance and Sexual Activity   Drug Use Never     Social History     Tobacco Use   Smoking Status Never Smoker   Smokeless Tobacco Never Used       Meds/Allergies   all medications and allergies reviewed  No Known Allergies    Review of Systems   Constitutional: Negative  Negative for chills and fever  HENT: Negative  Negative for ear pain and sore throat  Eyes: Negative  Negative for pain and visual disturbance  Respiratory: Negative    Negative for cough and shortness of breath  Cardiovascular: Negative  Negative for chest pain and palpitations  Gastrointestinal: Negative  Negative for abdominal pain and vomiting  Endocrine: Negative  Genitourinary: Negative  Negative for dysuria and hematuria  Musculoskeletal: Negative  Negative for arthralgias and back pain  Skin: Negative  Negative for color change and rash  Allergic/Immunologic: Negative  Neurological: Negative  Negative for seizures and syncope  Hematological: Negative  Psychiatric/Behavioral: Negative  All other systems reviewed and are negative  Objective:    /88 (BP Location: Left arm, Patient Position: Sitting, Cuff Size: Standard)   Pulse 86   Temp 97 7 °F (36 5 °C) (Tympanic)   Ht 5' 5" (1 651 m)   Wt 120 kg (263 lb 8 oz)   BMI 43 85 kg/m²       Physical Exam  Vitals and nursing note reviewed  Constitutional:       Appearance: Normal appearance  She is obese  HENT:      Head: Normocephalic and atraumatic  Nose: Nose normal       Mouth/Throat:      Mouth: Mucous membranes are moist       Pharynx: Oropharynx is clear  Eyes:      Extraocular Movements: Extraocular movements intact  Pupils: Pupils are equal, round, and reactive to light  Cardiovascular:      Rate and Rhythm: Normal rate and regular rhythm  Pulses: Normal pulses  Heart sounds: Normal heart sounds  Pulmonary:      Effort: Pulmonary effort is normal       Breath sounds: Normal breath sounds  Abdominal:      General: Abdomen is flat  Bowel sounds are normal       Palpations: Abdomen is soft  Comments: LLQ incision with improvement in redness  There is continued tracking that is 9cm deep with serosanguinous nonpurulent output  There is no induration or purulent drainage  Musculoskeletal:         General: Normal range of motion  Cervical back: Normal range of motion and neck supple  Skin:     General: Skin is warm and dry     Neurological:      General: No focal deficit present  Mental Status: She is alert and oriented to person, place, and time  Mental status is at baseline  Psychiatric:         Mood and Affect: Mood normal          Behavior: Behavior normal          Thought Content: Thought content normal          Judgment: Judgment normal              Assessment/Plan:     Diagnoses and all orders for this visit:    Bariatric surgery status         46 y o  female status post Diagnostic Laparoscopy, Extensive Lysis of Adhesions, Laparoscopic Removal of Adjustable Gastric Band, Intraoperative Endoscopy, and Gastric Band Port Explantation done on 12/28/21 by Dr Fam Vivar, overall doing Well  She has a LLQ seroma without evidence of infection  Wound was packed lightly with 1/4inch packing to allow for continued evacuation of seroma fluid  · Patient instructed to remove packing in 24-48 hours  · May allow soap and water to run over wound  Pat gently dry, do not rub  · Patient may complete her course of antibiotics as she has already started them  · Continue vitamins as directed  · Patient to follow up with dietician   · Continued/Maintain healthy weight loss with good nutrition intakes  · Adequate hydration with at least 64oz  fluid intake  · Follow diet as discussed  · Exercise as tolerated

## 2022-02-03 ENCOUNTER — TELEPHONE (OUTPATIENT)
Dept: BARIATRICS | Facility: CLINIC | Age: 52
End: 2022-02-03

## 2022-02-10 ENCOUNTER — OFFICE VISIT (OUTPATIENT)
Dept: BARIATRICS | Facility: CLINIC | Age: 52
End: 2022-02-10
Payer: COMMERCIAL

## 2022-02-10 VITALS
TEMPERATURE: 98 F | WEIGHT: 261.2 LBS | BODY MASS INDEX: 43.52 KG/M2 | HEIGHT: 65 IN | OXYGEN SATURATION: 97 % | RESPIRATION RATE: 20 BRPM | SYSTOLIC BLOOD PRESSURE: 128 MMHG | HEART RATE: 82 BPM | DIASTOLIC BLOOD PRESSURE: 78 MMHG

## 2022-02-10 DIAGNOSIS — E66.01 MORBID OBESITY WITH BMI OF 40.0-44.9, ADULT (HCC): Primary | ICD-10-CM

## 2022-02-10 PROCEDURE — 99214 OFFICE O/P EST MOD 30 MIN: CPT | Performed by: PHYSICIAN ASSISTANT

## 2022-02-10 NOTE — PATIENT INSTRUCTIONS
Goals: Food log (ie ) www myfitnesspal com,sparkpeople  com,loseit com,calorieking  com,etc    No sugary beverages  At least 64oz of water daily  Increase physical activity by 10 minutes daily  Gradually increase physical activity to a goal of 5 days per week for 30 minutes of MODERATE intensity PLUS 2 days per week of FULL BODY resistance training  3040-2091 calories per day  85 grams of protein per day  5-10 servings of fruits and vegetables per day  Please add protein snack between lunch and dinner      Visit wegovy  com for further information/injection instructions  Please eat small frequent meals to help reduce nausea  Lemon water and saltine crackers may help with this  If you experience fever, nausea/vomiting, and pain radiating to your back this may be a sign of pancreatitis  Please have ER evaluation with this occurs

## 2022-02-10 NOTE — PROGRESS NOTES
Assessment/Plan: Morbid obesity with BMI of 40 0-44 9, adult ([facility] )  -S/p Lap Band removal  -Patient is pursuing conservative program after [facility]  -Initial weight loss goal of 5-10% weight loss for improved health  -Screening labs: consider TSH  -Tolerating Saxenda at max dose without side effects - not meeting 5% TBW  Discussed changing to [facility] DAVON CASTILLO profile reviewed  -Reports food logging on MyFitness Pal  -Dietary recall reviewed, patient has increased protein  -Denies personal hx of pancreatitis  Denies family hx MEN2, MTC      Initial: 271 2 lbs (start of MWM 10/2021)  Current: 261 2 lbs  Change: -10 lbs  Goal: 185 lbs      Goals:    Food log (ie ) www myfitnesspal com,sparkpeople  com,loseit com,calorieking  com,etc    No sugary beverages  At least 64oz of water daily  Increase physical activity by 10 minutes daily  Gradually increase physical activity to a goal of 5 days per week for 30 minutes of MODERATE intensity PLUS 2 days per week of FULL BODY resistance training  3020-2604 calories per day  85 grams of protein per day  5-10 servings of fruits and vegetables per day  Please add protein snack between lunch and dinner      Visit wegovy  com for further information/injection instructions  Please eat small frequent meals to help reduce nausea  Lemon water and saltine crackers may help with this  If you experience fever, nausea/vomiting, and pain radiating to your back this may be a sign of pancreatitis  Please have ER evaluation with this occurs  Follow up in approximately 2 months with Non-Surgical Physician/Advanced Practitioner  Diagnoses and all orders for this visit:    Morbid obesity with BMI of 40 0-44 9, adult ([facility] )  -     Semaglutide-Weight Management (WEGOVY) 0 25 MG/0 5ML; Inject 0 5 mL (0 25 mg total) under the skin once a week for 4 doses  -     Semaglutide-Weight Management (WEGOVY) 0 5 MG/0 5ML;  Inject 0 5 mL (0 5 mg total) under the skin once a week for 4 doses          Subjective:   Chief Complaint   Patient presents with   [address] Follow-up        Patient ID: Mingo Lance  is a 46 y o  female with excess weight/obesity here to pursue weight managment  Patient is pursuing Conservative Program      HPI Patient presents for MWM follow up  On Saxenda max dose but reports being off for 1 week around the time of her band removal   Does not feel the Terrilee Venango has much effect on her appetite at that later part of the day    Food logging: Logging on My Fitness Pal 4952-4651 calories per day  Increased appetite/cravings: increased appetite towards later part of the day  Exercise: denies  Hydration: 6 bottles of water, cut out sweetened iced tea and replaced with crystal light or SF Darek aid, 2 cups of coffee + SF creamer    B: [place] protein shake - 20 grams protein +/- banana  S: skips  L: egg salad   S: peanut butter balls - 160 calories for 6  D:  Chicken w/ nedra sauce + salad   S: peanuts ( meausured)      Colonoscopy: cologuard completed 2021    The following portions of the patient's history were reviewed and updated as appropriate: allergies, current medications, past family history, past medical history, past social history, past surgical history and problem list     Review of Systems   Gastrointestinal: Negative for abdominal pain, diarrhea and vomiting  Psychiatric/Behavioral: Negative for dysphoric mood  Objective:    /78   Pulse 82   Temp 98 °F (36 7 °C)   Resp 20   Ht 5' 5" (1 651 m)   Wt 118 kg (261 lb 3 2 oz)   SpO2 97%   BMI 43 47 kg/m²      Physical Exam  Vitals and nursing note reviewed  Constitutional   General appearance: Abnormal   well developed and morbidly obese  Eyes No conjunctival pallor  Ears, Nose, Mouth, and Throat Oral mucosa moist    Pulmonary   Respiratory effort: No increased work of breathing or signs of respiratory distress      Auscultation of lungs: Clear to auscultation, equal breath sounds bilaterally, no wheezes, no rales, no rhonci  Cardiovascular   Auscultation of heart: Normal rate and rhythm, normal S1 and S2, without murmurs  Examination of extremities for edema and/or varicosities: Normal   no edema  Abdomen   Abdomen: Abnormal   The abdomen was obese  Bowel sounds were normal  The abdomen was soft and nontender     Musculoskeletal   Gait and station: Normal     Psychiatric   Orientation to person, place and time: Normal     Affect: appropriate

## 2022-02-10 NOTE — ASSESSMENT & PLAN NOTE
-S/p Lap Band removal  -Patient is pursuing conservative program after [facility]  -Initial weight loss goal of 5-10% weight loss for improved health  -Screening labs: consider TSH  -Tolerating Saxenda at max dose without side effects - not meeting 5% TBW  Discussed changing to [place]  SE profile reviewed  -Reports food logging on [employer]  -Dietary recall reviewed, patient has increased protein  -Denies personal hx of pancreatitis   Denies family hx MEN2, MTC      Initial: 271 2 lbs (start of MW 10/2021)  Current: 261 2 lbs  Change: -10 lbs  Goal: 185 lbs

## 2022-03-07 ENCOUNTER — TELEPHONE (OUTPATIENT)
Dept: BARIATRICS | Facility: CLINIC | Age: 52
End: 2022-03-07

## 2022-04-26 ENCOUNTER — TELEPHONE (OUTPATIENT)
Dept: BARIATRICS | Facility: CLINIC | Age: 52
End: 2022-04-26

## 2022-04-26 DIAGNOSIS — E66.01 MORBID OBESITY WITH BMI OF 40.0-44.9, ADULT (HCC): Primary | ICD-10-CM

## 2022-04-26 NOTE — TELEPHONE ENCOUNTER
Patient called to reschedule appointment that was missed  Scheduled follow up for patient for 5/31    Patient states she needs refill on Wegovy      [facility]: [facility]

## 2022-05-10 DIAGNOSIS — E66.01 MORBID OBESITY WITH BMI OF 40.0-44.9, ADULT (HCC): Primary | ICD-10-CM

## 2022-05-10 DIAGNOSIS — E66.01 MORBID OBESITY WITH BMI OF 40.0-44.9, ADULT (HCC): ICD-10-CM

## 2022-05-27 ENCOUNTER — TELEPHONE (OUTPATIENT)
Dept: BARIATRICS | Facility: CLINIC | Age: 52
End: 2022-05-27

## 2022-05-27 NOTE — TELEPHONE ENCOUNTER
Patient called stating that [address] informed her that Abdifatah Bennett is on back order until the end of the summer  She is asking if she could be put back on Saxenda because she does not want to go without meds until the end of the summer  Please advise @ [phone]

## 2022-05-31 DIAGNOSIS — E66.01 MORBID OBESITY WITH BMI OF 40.0-44.9, ADULT (HCC): Primary | ICD-10-CM

## 2022-05-31 RX ORDER — TOPIRAMATE 50 MG/1
TABLET, FILM COATED ORAL
Qty: 30 TABLET | Refills: 1 | Status: SHIPPED | OUTPATIENT
Start: 2022-05-31

## 2022-05-31 NOTE — TELEPHONE ENCOUNTER
LM for patient to return call  [facility] in [facility] has 1 7mg and 2 4mg dose in stock  Does she want me to send it there?  Do not recommend Saxenda as it was not effective for her

## 2022-05-31 NOTE — TELEPHONE ENCOUNTER
Patient reports only took 0 25mg dose  Informed her 0 5mg dose and 1mg dose currently unavailable  Will switch to Topamax  Denies hx nephrolithiasis or glaucoma  Reviewed the potential side effects of topiramate, which may include numbness or tingling, fatigue, upper respiratory infection symptoms, depression/anxiety, changes in taste, abdominal upset/heartburn, and trouble sleeping   Rx sent to pharmacy

## 2022-06-30 ENCOUNTER — OFFICE VISIT (OUTPATIENT)
Dept: BARIATRICS | Facility: CLINIC | Age: 52
End: 2022-06-30
Payer: COMMERCIAL

## 2022-06-30 VITALS
HEIGHT: 65 IN | HEART RATE: 102 BPM | WEIGHT: 275 LBS | RESPIRATION RATE: 20 BRPM | BODY MASS INDEX: 45.82 KG/M2 | TEMPERATURE: 98 F | DIASTOLIC BLOOD PRESSURE: 84 MMHG | OXYGEN SATURATION: 99 % | SYSTOLIC BLOOD PRESSURE: 136 MMHG

## 2022-06-30 DIAGNOSIS — E66.01 MORBID OBESITY WITH BMI OF 40.0-44.9, ADULT (HCC): Primary | ICD-10-CM

## 2022-06-30 PROCEDURE — 99214 OFFICE O/P EST MOD 30 MIN: CPT | Performed by: PHYSICIAN ASSISTANT

## 2022-06-30 RX ORDER — PHENTERMINE HYDROCHLORIDE 37.5 MG/1
TABLET ORAL
Qty: 15 TABLET | Refills: 1 | Status: SHIPPED | OUTPATIENT
Start: 2022-06-30

## 2022-06-30 NOTE — ASSESSMENT & PLAN NOTE
-S/p Lap Band removal  -Patient is pursuing conservative program after [facility]  -Initial weight loss goal of 5-10% weight loss for improved health  -Screening labs: consider TSH  -Tolerating Saxenda at max dose without side effects - not meeting 5% TBW  Discussed changing to [facility]ANN MARIE MAYS  Difficulty obtaining due to supply issue  Can consider in future  -On Topamax 50mg and tolerating well  Will add low dose phentermine  EKG up to date  Denies hx CAD, PAD, palpitations, heart arrhythmia, glaucoma   Previously on phentermine and tolerated well  -dietary recall reviewed, suggestions provided      Initial: 271 2 lbs (start of MWM 10/2021)  Current: 275 lbs  Change: +3 8 lbs from initial, +13 8 lbs since last visit  Goal: 185 lbs

## 2022-06-30 NOTE — PROGRESS NOTES
Assessment/Plan: Morbid obesity with BMI of 40 0-44 9, adult ([facility] )  -S/p Lap Band removal  -Patient is pursuing conservative program after [facility]estyle [facility]vention Program  -Initial weight loss goal of 5-10% weight loss for improved health  -Screening labs: consider TSH  -Tolerating Saxenda at max dose without side effects - not meeting 5% TBW  Discussed changing to [facility] DAVON  Difficulty obtaining due to supply issue  Can consider in future  -On Topamax 50mg and tolerating well  Will add low dose phentermine  EKG up to date  Denies hx CAD, PAD, palpitations, heart arrhythmia, glaucoma  Previously on phentermine and tolerated well  -dietary recall reviewed, suggestions provided      Initial: 271 2 lbs (start of [facility] 10/2021)  Current: 275 lbs  Change: +3 8 lbs from initial, +13 8 lbs since last visit  Goal: 185 lbs    Goals:    Food log (ie ) www myfitnesspal com,sparkpeople  com,loseit com,calorieking  com,etc    No sugary beverages  At least 64oz of water daily  Increase physical activity by 10 minutes daily  Gradually increase physical activity to a goal of 5 days per week for 30 minutes of MODERATE intensity PLUS 2 days per week of FULL BODY resistance training  8046-2907 calories per day  85 grams of protein per day  Consider Olive oil Hooper  Multigrain crackers  Bagel thin  Monitor blood pressure and heart rate daily  Notify office if greater than 140/90 consistently and 100 for heart rate    Follow up in approximately 2 months with Non-Surgical Physician/Advanced Practitioner  Diagnoses and all orders for this visit:    Morbid obesity with BMI of 40 0-44 9, adult ([facility] )    BMI 45 0-49 9, adult ([facility])  -     phentermine (ADIPEX-P) 37 5 MG tablet; Take 1/2 tab po daily in AM          Subjective:   Chief Complaint   Patient presents with    Follow-up        Patient ID: Kenroy Washburn  is a 46 y o  female with excess weight/obesity here to pursue weight managment    Patient is pursuing Conservative Program      HPI Patient presents for MWM follow up  Issues with supply of Wegovy so no longer on the medication  Switched to Topamax  Has been on for past 2 5 3 weeks    Food logging: [employer], 3x per week  Exercise: walking more, plans to start walking more with foster child  Hydration: 6 bottles of water per day, crangrape juice (200 calories, 1 per day), SF Darek Aid  ETOH: none, except on vacations    B: banana OR half  Bagel   S: skips  L: tuna packet + HB egg + 3 TBSP meneses On 6 ritz crackers or saltines  S: skips  D: salad w/ chicken OR grilled chicken + pasta + peas/lima beans/corn  S: SF ice pop    Colonoscopy: cologuard completed 2021    The following portions of the patient's history were reviewed and updated as appropriate: allergies, current medications, past family history, past medical history, past social history, past surgical history and problem list     Review of Systems   Respiratory: Negative  Cardiovascular: Negative  Gastrointestinal: Negative  Objective:    /84   Pulse 102   Temp 98 °F (36 7 °C)   Resp 20   Ht 5' 5" (1 651 m)   Wt 125 kg (275 lb)   SpO2 99%   BMI 45 76 kg/m²  Recheck of pulse 94     Physical Exam  Vitals and nursing note reviewed  Constitutional   General appearance: Abnormal   well developed and morbidly obese  Eyes No conjunctival pallor  Ears, Nose, Mouth, and Throat Oral mucosa moist    Pulmonary   Respiratory effort: No increased work of breathing or signs of respiratory distress  Auscultation of lungs: Clear to auscultation, equal breath sounds bilaterally, no wheezes, no rales, no rhonci  Cardiovascular   Auscultation of heart: Normal rate and rhythm, normal S1 and S2, without murmurs  Examination of extremities for edema and/or varicosities: Normal   no edema  Abdomen   Abdomen: Abnormal   The abdomen was obese  Bowel sounds were normal  The abdomen was soft and nontender     Musculoskeletal   Gait and station: Normal     Psychiatric   Orientation to person, place and time: Normal     Affect: appropriate

## 2022-06-30 NOTE — PATIENT INSTRUCTIONS
Goals: Food log (ie ) www myfitnesspal com,sparkpeople  com,loseit com,calorieking  com,etc    No sugary beverages  At least 64oz of water daily  Increase physical activity by 10 minutes daily  Gradually increase physical activity to a goal of 5 days per week for 30 minutes of MODERATE intensity PLUS 2 days per week of FULL BODY resistance training  5879-1821 calories per day  85 grams of protein per day  Consider Olive oil Hooper  Multigrain crackers  Bagel thin  Monitor blood pressure and heart rate daily   Notify office if greater than 140/90 consistently and 100 for heart rate

## 2022-10-06 ENCOUNTER — TELEPHONE (OUTPATIENT)
Dept: GASTROENTEROLOGY | Facility: CLINIC | Age: 52
End: 2022-10-06

## 2022-10-06 NOTE — TELEPHONE ENCOUNTER
Continue half tablet  PDMP review reveals patient has not been taking medication consistently  Advise taking daily for best results   Can discuss dose change at follow up

## 2022-11-15 ENCOUNTER — OFFICE VISIT (OUTPATIENT)
Dept: BARIATRICS | Facility: CLINIC | Age: 52
End: 2022-11-15

## 2022-11-15 VITALS
SYSTOLIC BLOOD PRESSURE: 126 MMHG | OXYGEN SATURATION: 97 % | HEART RATE: 97 BPM | WEIGHT: 266 LBS | RESPIRATION RATE: 20 BRPM | DIASTOLIC BLOOD PRESSURE: 76 MMHG | BODY MASS INDEX: 44.32 KG/M2 | TEMPERATURE: 98.4 F | HEIGHT: 65 IN

## 2022-11-15 DIAGNOSIS — E78.5 HYPERLIPIDEMIA: ICD-10-CM

## 2022-11-15 DIAGNOSIS — E66.01 MORBID OBESITY WITH BMI OF 40.0-44.9, ADULT (HCC): Primary | ICD-10-CM

## 2022-11-15 RX ORDER — PHENTERMINE HYDROCHLORIDE 37.5 MG/1
TABLET ORAL
Qty: 30 TABLET | Refills: 1 | Status: SHIPPED | OUTPATIENT
Start: 2022-11-15

## 2022-11-15 NOTE — ASSESSMENT & PLAN NOTE
-S/p Lap Band removal  -Patient is pursuing conservative program after [facility]vention [facility]  -Initial weight loss goal of 5-10% weight loss for improved health  -Screening labs: update labs, ordered  -Saxenda ineffective  -On Topamax 50mg and 18 75mg dose phentermine  EKG up to date  Denies hx CAD, PAD, palpitations, heart arrhythmia, glaucoma  Does not feel much effect at that dose  Patient requesting to increase dose  Patient padron must take medication daily for optimal results   Patient aware this is a controlled substance it requires frequent follow up in order to obtain refills  -dietary recall reviewed, suggestions provided  -encouraged patient to meet with RD, patient declines  -advised to monitor HR and BP - notify office if consistently greater than 140/90 and and HR greater than 100    Initial: 271 2 lbs (start of [facility] 10/2021)  Current: 266 lbs  Change: -5 2 lbs from initial, - 9 lbs from last visit  Goal: 185 lbs

## 2022-11-15 NOTE — PATIENT INSTRUCTIONS
Goals: Food log (ie ) www myfitnesspal com,sparkpeople  com,loseit com,calorieking  com,etc    No sugary beverages  At least 64oz of water daily  Increase physical activity by 10 minutes daily   Gradually increase physical activity to a goal of 5 days per week for 30 minutes of MODERATE intensity PLUS 2 days per week of FULL BODY resistance training  5601-4652 calories per day  85 grams of protein per day  Don't skip meals  1/2 cup serving for starch  Use whole grain or whole wheat over white bread/rice/pasta  Increase serving of veggies

## 2022-11-15 NOTE — PROGRESS NOTES
Assessment/Plan: Morbid obesity with BMI of 40 0-44 9, adult ([facility] )  -S/p Lap Band removal  -Patient is pursuing conservative program after [facility]  -Initial weight loss goal of 5-10% weight loss for improved health  -Screening labs: update labs, ordered  -Saxenda ineffective  -On Topamax 50mg and 18 75mg dose phentermine  EKG up to date  Denies hx CAD, PAD, palpitations, heart arrhythmia, glaucoma  Does not feel much effect at that dose  Patient requesting to increase dose  Patient padron must take medication daily for optimal results  Patient aware this is a controlled substance it requires frequent follow up in order to obtain refills  -dietary recall reviewed, suggestions provided  -encouraged patient to meet with RD, patient declines  -advised to monitor HR and BP - notify office if consistently greater than 140/90 and and HR greater than 100    Initial: 271 2 lbs (start of MWM 10/2021)  Current: 266 lbs  Change: -5 2 lbs from initial, - 9 lbs from last visit  Goal: 185 lbs    Goals:    Food log (ie ) www myfitnesspal com,sparkpeople  com,loseit com,calorieking  com,etc    No sugary beverages  At least 64oz of water daily  Increase physical activity by 10 minutes daily  Gradually increase physical activity to a goal of 5 days per week for 30 minutes of MODERATE intensity PLUS 2 days per week of FULL BODY resistance training  8272-5692 calories per day  85 grams of protein per day  Don't skip meals  1/2 cup serving for starch  Use whole grain or whole wheat over white bread/rice/pasta  Increase serving of veggies    Follow up in approximately 6 weeks with Non-Surgical Physician/Advanced Practitioner  Diagnoses and all orders for this visit:    Morbid obesity with BMI of 40 0-44 9, adult ([facility] )  -     Comprehensive metabolic panel; Future  -     Lipid panel; Future  -     TSH, 3rd generation with Free T4 reflex;  Future  -     Hemoglobin A1C; Future    Hyperlipidemia  -     Comprehensive metabolic panel; Future  -     Lipid panel; Future  -     TSH, 3rd generation with Free T4 reflex; Future  -     Hemoglobin A1C; Future          Subjective:   Chief Complaint   Patient presents with   • Follow-up        Patient ID: Yong Shah  is a 46 y o  female with excess weight/obesity here to pursue weight managment  Patient is pursuing Conservative Program      HPI Patient presents for MWM follow up  Has not been seen here since June 2022     -On Topamax 50mg AM and has been out of Phentermine for last 3 weeks  Denies adverse SE from phentermine  Reports her sister which is her family physician has been filling her Topamax  Denies elevated BP/HR while on Phentermine    Hydration: meeting water goals, coffee + creamer + splenda,  Diet enedelia aid  ETOH:  1 glass of wine every 2 weeks  Exercise: denies  SLeep: 8 hours      B: skips or banana  S: skips  L: white wrap deli meat/cheese + meneses wrap   S: skip  D: white pasta + chicken   S: skips      Colonoscopy: completed cologuard 3/2021    The following portions of the patient's history were reviewed and updated as appropriate: allergies, current medications, past family history, past medical history, past social history, past surgical history and problem list     Review of Systems   Respiratory: Negative  Cardiovascular: Negative  Psychiatric/Behavioral: Negative  Objective:    /76   Pulse 97   Temp 98 4 °F (36 9 °C)   Resp 20   Ht 5' 5" (1 651 m)   Wt 121 kg (266 lb)   SpO2 97%   BMI 44 26 kg/m²      Physical Exam  Vitals and nursing note reviewed  Constitutional   General appearance: Abnormal   well developed and morbidly obese  Eyes No conjunctival pallor  Ears, Nose, Mouth, and Throat Oral mucosa moist    Pulmonary   Respiratory effort: No increased work of breathing or signs of respiratory distress      Auscultation of lungs: Clear to auscultation, equal breath sounds bilaterally, no wheezes, no rales, no rhonci  Cardiovascular   Auscultation of heart: Normal rate and rhythm, normal S1 and S2, without murmurs  Examination of extremities for edema and/or varicosities: Normal   no edema  Abdomen   Abdomen: Abnormal   The abdomen was obese  Bowel sounds were normal  The abdomen was soft and nontender     Musculoskeletal   Gait and station: Normal     Psychiatric   Orientation to person, place and time: Normal     Affect: appropriate

## 2023-02-06 ENCOUNTER — APPOINTMENT (OUTPATIENT)
Dept: LAB | Facility: HOSPITAL | Age: 53
End: 2023-02-06

## 2023-02-06 DIAGNOSIS — Z82.49 FAMILY HISTORY OF ISCHEMIC HEART DISEASE: ICD-10-CM

## 2023-02-06 DIAGNOSIS — E66.01 MORBID OBESITY WITH BMI OF 40.0-44.9, ADULT (HCC): ICD-10-CM

## 2023-02-06 DIAGNOSIS — E78.5 HYPERLIPIDEMIA: ICD-10-CM

## 2023-02-06 LAB
ALBUMIN SERPL BCP-MCNC: 4.3 G/DL (ref 3.5–5)
ALP SERPL-CCNC: 62 U/L (ref 34–104)
ALT SERPL W P-5'-P-CCNC: 17 U/L (ref 7–52)
ANION GAP SERPL CALCULATED.3IONS-SCNC: 9 MMOL/L (ref 4–13)
AST SERPL W P-5'-P-CCNC: 20 U/L (ref 13–39)
BILIRUB SERPL-MCNC: 1.05 MG/DL (ref 0.2–1)
BUN SERPL-MCNC: 19 MG/DL (ref 5–25)
CALCIUM SERPL-MCNC: 9.6 MG/DL (ref 8.4–10.2)
CHLORIDE SERPL-SCNC: 100 MMOL/L (ref 96–108)
CHOLEST SERPL-MCNC: 130 MG/DL
CO2 SERPL-SCNC: 29 MMOL/L (ref 21–32)
CREAT SERPL-MCNC: 1.08 MG/DL (ref 0.6–1.3)
ERYTHROCYTE [DISTWIDTH] IN BLOOD BY AUTOMATED COUNT: 13.8 % (ref 11.6–15.1)
EST. AVERAGE GLUCOSE BLD GHB EST-MCNC: 100 MG/DL
GFR SERPL CREATININE-BSD FRML MDRD: 59 ML/MIN/1.73SQ M
GLUCOSE P FAST SERPL-MCNC: 95 MG/DL (ref 65–99)
HBA1C MFR BLD: 5.1 %
HCT VFR BLD AUTO: 47.4 % (ref 34.8–46.1)
HDLC SERPL-MCNC: 40 MG/DL
HGB BLD-MCNC: 15.4 G/DL (ref 11.5–15.4)
LDLC SERPL CALC-MCNC: 81 MG/DL (ref 0–100)
MCH RBC QN AUTO: 28.3 PG (ref 26.8–34.3)
MCHC RBC AUTO-ENTMCNC: 32.5 G/DL (ref 31.4–37.4)
MCV RBC AUTO: 87 FL (ref 82–98)
NONHDLC SERPL-MCNC: 90 MG/DL
PLATELET # BLD AUTO: 239 THOUSANDS/UL (ref 149–390)
PMV BLD AUTO: 9.9 FL (ref 8.9–12.7)
POTASSIUM SERPL-SCNC: 3.3 MMOL/L (ref 3.5–5.3)
PROT SERPL-MCNC: 7.5 G/DL (ref 6.4–8.4)
RBC # BLD AUTO: 5.44 MILLION/UL (ref 3.81–5.12)
SODIUM SERPL-SCNC: 138 MMOL/L (ref 135–147)
TRIGL SERPL-MCNC: 44 MG/DL
TSH SERPL DL<=0.05 MIU/L-ACNC: 2.28 UIU/ML (ref 0.45–4.5)
WBC # BLD AUTO: 6.26 THOUSAND/UL (ref 4.31–10.16)

## 2023-02-07 ENCOUNTER — OFFICE VISIT (OUTPATIENT)
Dept: BARIATRICS | Facility: CLINIC | Age: 53
End: 2023-02-07

## 2023-02-07 VITALS
HEART RATE: 98 BPM | TEMPERATURE: 98.2 F | OXYGEN SATURATION: 98 % | DIASTOLIC BLOOD PRESSURE: 76 MMHG | BODY MASS INDEX: 42.35 KG/M2 | HEIGHT: 65 IN | RESPIRATION RATE: 20 BRPM | SYSTOLIC BLOOD PRESSURE: 128 MMHG | WEIGHT: 254.2 LBS

## 2023-02-07 DIAGNOSIS — E66.01 MORBID OBESITY WITH BMI OF 40.0-44.9, ADULT (HCC): Primary | ICD-10-CM

## 2023-02-07 RX ORDER — PHENTERMINE HYDROCHLORIDE 37.5 MG/1
TABLET ORAL
Qty: 30 TABLET | Refills: 1 | Status: SHIPPED | OUTPATIENT
Start: 2023-02-07

## 2023-02-07 RX ORDER — TOPIRAMATE 50 MG/1
TABLET, FILM COATED ORAL
Qty: 30 TABLET | Refills: 1 | Status: SHIPPED | OUTPATIENT
Start: 2023-02-07

## 2023-02-07 NOTE — PROGRESS NOTES
Assessment/Plan: Morbid obesity with BMI of 40 0-44 9, adult (HCC)  -S/p Lap Band removal  -Patient is pursuing conservative program after [facility]  -Initial weight loss goal of 5-10% weight loss for improved health  -Screening labs: up to date, reviewed with patient at appointment  -Rut ineffective  -On Topamax 50mg and 37 5mg dose phentermine  EKG up to date 12/2021  Denies hx CAD, PAD, palpitations, heart arrhythmia, glaucoma  Patient padron must take medication daily for optimal results  Patient aware this is a controlled substance it requires frequent follow up in order to obtain refills  -dietary recall reviewed, suggestions provided  -encouraged patient to meet with RD, patient declines  -advised to monitor HR and BP - notify office if consistently greater than 140/90 and and HR greater than 100    Initial: 271 2 lbs (start of MWM 10/2021), highest 275 lbs  Current: 254 2 lbs  Change: -17 lbs from initial, - 20 8 lbs  Goal: 185 lbs    Goals:    Food log (ie ) www myfitnesspal com,sparkpeople  com,loseit com,calorieking  com,etc    No sugary beverages  At least 64oz of water daily  Increase physical activity by 10 minutes daily  Gradually increase physical activity to a goal of 5 days per week for 30 minutes of MODERATE intensity PLUS 2 days per week of FULL BODY resistance training  9482-5517 calories per day  85 grams of protein per day  Increase Serving of veggies      Follow up in approximately 6 weeks with Non-Surgical Physician/Advanced Practitioner  Diagnoses and all orders for this visit:    Morbid obesity with BMI of 40 0-44 9, adult (HCC)  -     phentermine (ADIPEX-P) 37 5 MG tablet; Take 1 tab po daily in AM  -     topiramate (Topamax) 50 MG tablet; Take 1 Tab po HS    BMI 45 0-49 9, adult (HCC)  -     phentermine (ADIPEX-P) 37 5 MG tablet;  Take 1 tab po daily in AM          Subjective:   Chief Complaint   Patient presents with   • Follow-up Patient ID: Kayla Mullen  is a 46 y o  female with excess weight/obesity here to pursue weight managment  Patient is pursuing Conservative Program      HPI Patient presents for MWM follow up  Since she has lost more weight she is feeling more motivated to exercise  Missed her last appt so she has been out of her Phentermine for a couple weeks    Reviewed her labs that she completed yesterday  She notes LE cramping  Potassium mildly low  Advised her to follow up PCP regarding potassium supplement as she is on diuertic    Exercise: Elliptical 15min 2x per week - gets short of breath so takes a break  Just started 2 weeks ago  Denies chest discomfort/palpitations  Hydration: water 6 bottles, coffee + SF creamer + sweet and low, diet Darek aid  Fruit: 0-1, veggies 0-1    B: hard boiled egg or banana  S: skips  L: egg salad or tuna salad  S: skip  D: sausage or chicken + white rice + peas/corn  S: skips or popcorn or triscuits      The following portions of the patient's history were reviewed and updated as appropriate: allergies, current medications, past family history, past medical history, past social history, past surgical history and problem list     Review of Systems   Respiratory: Positive for shortness of breath ( w/ exertion)  Cardiovascular: Negative for chest pain and palpitations  Gastrointestinal: Negative for abdominal pain, nausea and vomiting  Psychiatric/Behavioral: Negative for dysphoric mood  Objective:    /76 (BP Location: Left arm, Patient Position: Sitting, Cuff Size: Large)   Pulse 98   Temp 98 2 °F (36 8 °C)   Resp 20   Ht 5' 5" (1 651 m)   Wt 115 kg (254 lb 3 2 oz)   SpO2 98%   BMI 42 30 kg/m²  Recheck of HR 80 BPM     Physical Exam  Vitals and nursing note reviewed  Constitutional   General appearance: Abnormal   well developed and morbidly obese  Eyes No conjunctival pallor     Ears, Nose, Mouth, and Throat Oral mucosa moist    Pulmonary   Respiratory effort: No increased work of breathing or signs of respiratory distress  Auscultation of lungs: Clear to auscultation, equal breath sounds bilaterally, no wheezes, no rales, no rhonci  Cardiovascular   Auscultation of heart: Normal rate and rhythm, normal S1 and S2, without murmurs  Examination of extremities for edema and/or varicosities: Normal   no edema  Abdomen   Abdomen: Abnormal   The abdomen was obese  Bowel sounds were normal  The abdomen was soft and nontender     Musculoskeletal   Gait and station: Normal     Psychiatric   Orientation to person, place and time: Normal     Affect: appropriate

## 2023-02-07 NOTE — ASSESSMENT & PLAN NOTE
-S/p Lap Band removal  -Patient is pursuing conservative program after [facility]  -Initial weight loss goal of 5-10% weight loss for improved health  -Screening labs: up to date, reviewed with patient at appointment  -Rut ineffective  -On Topamax 50mg and 37 5mg dose phentermine  EKG up to date 12/2021  Denies hx CAD, PAD, palpitations, heart arrhythmia, glaucoma  Patient padron must take medication daily for optimal results   Patient aware this is a controlled substance it requires frequent follow up in order to obtain refills  -dietary recall reviewed, suggestions provided  -encouraged patient to meet with RD, patient declines  -advised to monitor HR and BP - notify office if consistently greater than 140/90 and and HR greater than 100    Initial: 271 2 lbs (start of [facility] 10/2021), highest 275 lbs  Current: 254 2 lbs  Change: -17 lbs from initial, - 20 8 lbs  Goal: 185 lbs

## 2023-02-07 NOTE — PATIENT INSTRUCTIONS
Goals: Food log (ie ) www myfitnesspal com,sparkpeople  com,loseit com,calorieking  com,etc    No sugary beverages  At least 64oz of water daily  Increase physical activity by 10 minutes daily   Gradually increase physical activity to a goal of 5 days per week for 30 minutes of MODERATE intensity PLUS 2 days per week of FULL BODY resistance training  0544-5757 calories per day  85 grams of protein per day  Increase Serving of veggies

## 2023-03-28 ENCOUNTER — HOSPITAL ENCOUNTER (OUTPATIENT)
Dept: MAMMOGRAPHY | Facility: HOSPITAL | Age: 53
Discharge: HOME | End: 2023-03-28
Attending: FAMILY MEDICINE

## 2023-04-28 DIAGNOSIS — E66.01 MORBID OBESITY WITH BMI OF 40.0-44.9, ADULT (HCC): ICD-10-CM

## 2023-04-28 NOTE — TELEPHONE ENCOUNTER
Patient called office to schedule an appt due to missing last appt  Offered first available appt in July  Patient stated she is going to run out of medication and was wondering if you can send a refill of phentermine to pharmacy  Please advise, thank you

## 2023-05-01 RX ORDER — PHENTERMINE HYDROCHLORIDE 37.5 MG/1
TABLET ORAL
Qty: 30 TABLET | Refills: 1 | OUTPATIENT
Start: 2023-05-01

## 2023-05-01 NOTE — TELEPHONE ENCOUNTER
Unable to refill Phentermine without an appt   Patient will need to remain off Phentermine until she is seen again

## 2023-05-02 ENCOUNTER — OFFICE VISIT (OUTPATIENT)
Dept: BARIATRICS | Facility: CLINIC | Age: 53
End: 2023-05-02

## 2023-05-02 VITALS
BODY MASS INDEX: 40.02 KG/M2 | SYSTOLIC BLOOD PRESSURE: 116 MMHG | RESPIRATION RATE: 18 BRPM | OXYGEN SATURATION: 100 % | WEIGHT: 255 LBS | HEART RATE: 97 BPM | DIASTOLIC BLOOD PRESSURE: 82 MMHG | HEIGHT: 67 IN

## 2023-05-02 DIAGNOSIS — E66.01 SEVERE OBESITY (HCC): Primary | ICD-10-CM

## 2023-05-02 RX ORDER — TOPIRAMATE 50 MG/1
TABLET, FILM COATED ORAL
Qty: 60 TABLET | Refills: 1 | Status: SHIPPED | OUTPATIENT
Start: 2023-05-02

## 2023-05-02 RX ORDER — PHENTERMINE HYDROCHLORIDE 37.5 MG/1
TABLET ORAL
Qty: 30 TABLET | Refills: 1 | Status: SHIPPED | OUTPATIENT
Start: 2023-05-02

## 2023-05-02 RX ORDER — TOPIRAMATE 50 MG/1
TABLET, FILM COATED ORAL
Qty: 30 TABLET | Refills: 1 | Status: SHIPPED | OUTPATIENT
Start: 2023-05-02 | End: 2023-05-02 | Stop reason: SDUPTHER

## 2023-05-02 RX ORDER — POTASSIUM CHLORIDE 750 MG/1
CAPSULE, EXTENDED RELEASE ORAL
COMMUNITY
Start: 2023-02-08

## 2023-05-02 NOTE — ASSESSMENT & PLAN NOTE
-S/p Lap Band removal  -Patient is pursuing conservative program after [facility]  -Initial weight loss goal of 5-10% weight loss for improved health  -Screening labs: up to date  -Saxenda ineffective  -On Topamax 50mg and 37 5mg dose phentermine  EKG up to date 12/2021  Denies hx CAD, PAD, palpitations, heart arrhythmia, glaucoma  Will increase Topamax to 50mg BID  Patient to start with 25mg in AM for first week and then increase to 50mg BID  Patient ware must take medication daily for optimal results   Patient aware this is a controlled substance it requires frequent follow up in order to obtain refills  -dietary recall reviewed, suggestions provided  -encouraged patient to meet with RD, patient declines  -advised to monitor HR and BP - notify office if consistently greater than 140/90 and and HR greater than 100    Initial: 271 2 lbs (start of [facility] 10/2021), highest 275 lbs  Last OV: 254 2 lbs  Current: 255 lbs  Change: -20lbs from highest  Goal: 185 lbs

## 2023-05-02 NOTE — PROGRESS NOTES
Assessment/Plan:    Severe obesity ([facility] )  -S/p Lap Band removal  -Patient is pursuing conservative program after [facility]  -Initial weight loss goal of 5-10% weight loss for improved health  -Screening labs: up to date  -Saxenda ineffective  -On Topamax 50mg and 37 5mg dose phentermine  EKG up to date 12/2021  Denies hx CAD, PAD, palpitations, heart arrhythmia, glaucoma  Will increase Topamax to 50mg BID  Patient to start with 25mg in AM for first week and then increase to 50mg BID  Patient ware must take medication daily for optimal results  Patient aware this is a controlled substance it requires frequent follow up in order to obtain refills  -dietary recall reviewed, suggestions provided  -encouraged patient to meet with RD, patient declines  -advised to monitor HR and BP - notify office if consistently greater than 140/90 and and HR greater than 100    Initial: 271 2 lbs (start of MWM 10/2021), highest 275 lbs  Last OV: 254 2 lbs  Current: 255 lbs  Change: -20lbs from highest  Goal: 185 lbs    Goals:    Food log (ie ) www myfitnesspal com,sparkpeople  com,loseit com,calorieking  com,etc    No sugary beverages  At least 64oz of water daily  Increase physical activity by 10 minutes daily  Gradually increase physical activity to a goal of 5 days per week for 30 minutes of MODERATE intensity PLUS 2 days per week of FULL BODY resistance training  1523-0228 calories per day  85 grams of protein per day  Continue to monitor blood pressure and heart rate  Notify office if consistently greater than 100 for heart rate and 140/90 for blood pressure    Follow up in approximately 6 weeks for MA visit and 10 weeks with PA-C      Diagnoses and all orders for this visit:    Severe obesity ([facility] )  -     topiramate (Topamax) 50 MG tablet; Take 1 tab po BID  -     phentermine (ADIPEX-P) 37 5 MG tablet;  Take 1 tab po daily in AM    BMI 39 0-39 9,adult    Other orders  -     potassium "chloride (MICRO-K) 10 MEQ CR capsule  -     Discontinue: topiramate (Topamax) 50 MG tablet; Take 1 Tab po HS          Subjective:   No chief complaint on file  Patient ID: Luis Antonio White  is a 46 y o  female with excess weight/obesity here to pursue weight managment  Patient is pursuing Conservative Program      HPI Patient presents for MWM follow up  Remains On Topamax 50mg daily and Phentermine 37 5mg daily  Reports she ran out of Phentermine 2 weeks ago  States she has increased exercise since last visit  Notes she is in a smaller pant size    Exercise: walking 2-3x per week for 3 miles, stationary bike once per week for 15 minutes  Wants to start to incorporating core exercises  Hydration: 6 bottles of water, diet enedelia aid  Coffee + SF creamer and Sweet and low  -ETOH: Denies  -Sleep: 6-8 hours    B: Banana OR sausage nisreen Or premiere protein shake + coffee  S: balanced break  L: white or spinach wrap with cheese and chicken + marinara sauce  S: skips OR premiere shake  D: chicken + hamburger helper OR chili   S: skips    Wt Readings from Last 10 Encounters:   05/02/23 116 kg (255 lb)   04/05/23 115 kg (254 lb)   02/07/23 115 kg (254 lb 3 2 oz)   11/15/22 121 kg (266 lb)   06/30/22 125 kg (275 lb)   02/10/22 118 kg (261 lb 3 2 oz)   01/07/22 120 kg (263 lb 8 oz)   01/04/22 119 kg (261 lb 8 oz)   12/28/21 116 kg (256 lb 6 3 oz)   12/23/21 116 kg (255 lb 8 oz)        The following portions of the patient's history were reviewed and updated as appropriate: allergies, current medications, past family history, past medical history, past social history, past surgical history and problem list     Review of Systems   Respiratory: Negative  Cardiovascular: Negative  Objective:    /82   Pulse 97   Resp 18   Ht 5' 7\" (1 702 m)   Wt 116 kg (255 lb)   SpO2 100%   BMI 39 94 kg/m²      Physical Exam  Vitals and nursing note reviewed       Constitutional   General appearance: Abnormal   well " developed and obese  Eyes No conjunctival pallor  Ears, Nose, Mouth, and Throat Oral mucosa moist    Pulmonary   Respiratory effort: No increased work of breathing or signs of respiratory distress  Auscultation of lungs: Clear to auscultation, equal breath sounds bilaterally, no wheezes, no rales, no rhonci  Cardiovascular   Auscultation of heart: Normal rate and rhythm, normal S1 and S2, without murmurs  Examination of extremities for edema and/or varicosities: Normal   no edema  Abdomen   Abdomen: Abnormal   The abdomen was obese  Bowel sounds were normal  The abdomen was soft and nontender     Musculoskeletal   Gait and station: Normal     Psychiatric   Orientation to person, place and time: Normal     Affect: appropriate

## 2023-05-02 NOTE — PATIENT INSTRUCTIONS
Goals: Food log (ie ) www myfitnesspal com,sparkpeople  com,loseit com,calorieking  com,etc    No sugary beverages  At least 64oz of water daily  Increase physical activity by 10 minutes daily  Gradually increase physical activity to a goal of 5 days per week for 30 minutes of MODERATE intensity PLUS 2 days per week of FULL BODY resistance training  8581-9720 calories per day  85 grams of protein per day  Continue to monitor blood pressure and heart rate   Notify office if consistently greater than 100 for heart rate and 140/90 for blood pressure

## 2023-06-20 ENCOUNTER — CLINICAL SUPPORT (OUTPATIENT)
Dept: BARIATRICS | Facility: CLINIC | Age: 53
End: 2023-06-20
Payer: COMMERCIAL

## 2023-06-20 ENCOUNTER — TELEPHONE (OUTPATIENT)
Dept: BARIATRICS | Facility: CLINIC | Age: 53
End: 2023-06-20

## 2023-06-20 VITALS
DIASTOLIC BLOOD PRESSURE: 78 MMHG | HEIGHT: 67 IN | BODY MASS INDEX: 39.83 KG/M2 | WEIGHT: 253.8 LBS | RESPIRATION RATE: 20 BRPM | OXYGEN SATURATION: 98 % | HEART RATE: 90 BPM | SYSTOLIC BLOOD PRESSURE: 132 MMHG

## 2023-06-20 DIAGNOSIS — E66.01 SEVERE OBESITY (HCC): ICD-10-CM

## 2023-06-20 DIAGNOSIS — Z01.30 BLOOD PRESSURE CHECK: Primary | ICD-10-CM

## 2023-06-20 PROCEDURE — 99211 OFF/OP EST MAY X REQ PHY/QHP: CPT

## 2023-06-20 RX ORDER — PHENTERMINE HYDROCHLORIDE 37.5 MG/1
TABLET ORAL
Qty: 30 TABLET | Refills: 1 | Status: SHIPPED | OUTPATIENT
Start: 2023-06-20

## 2023-06-20 NOTE — PROGRESS NOTES
- Is BP less than 100/60? No  - Is BP greater than 140/90? No  - Is HR greater than 100?  No  **If yes to any of the above, have patient relax and repeat in 5-10 minutes**

## 2023-06-20 NOTE — TELEPHONE ENCOUNTER
Prior auth started     Padmaja Stevens (Key: [facility]) - [id number]  Phentermine HCl 37  5MG tablets

## 2023-07-10 ENCOUNTER — TELEPHONE (OUTPATIENT)
Dept: OTHER | Facility: OTHER | Age: 53
End: 2023-07-10

## 2023-12-07 DIAGNOSIS — Z00.6 ENCOUNTER FOR EXAMINATION FOR NORMAL COMPARISON OR CONTROL IN CLINICAL RESEARCH PROGRAM: ICD-10-CM

## 2023-12-19 ENCOUNTER — APPOINTMENT (OUTPATIENT)
Dept: LAB | Facility: HOSPITAL | Age: 53
End: 2023-12-19

## 2023-12-19 DIAGNOSIS — Z00.6 ENCOUNTER FOR EXAMINATION FOR NORMAL COMPARISON OR CONTROL IN CLINICAL RESEARCH PROGRAM: ICD-10-CM

## 2023-12-19 PROCEDURE — 36415 COLL VENOUS BLD VENIPUNCTURE: CPT

## 2024-01-23 LAB
APOB+LDLR+PCSK9 GENE MUT ANL BLD/T: NOT DETECTED
BRCA1+BRCA2 DEL+DUP + FULL MUT ANL BLD/T: NOT DETECTED
MLH1+MSH2+MSH6+PMS2 GN DEL+DUP+FUL M: NOT DETECTED

## 2024-02-29 ENCOUNTER — OFFICE VISIT (OUTPATIENT)
Dept: BARIATRICS | Facility: CLINIC | Age: 54
End: 2024-02-29
Payer: COMMERCIAL

## 2024-02-29 VITALS
HEIGHT: 65 IN | DIASTOLIC BLOOD PRESSURE: 90 MMHG | OXYGEN SATURATION: 99 % | TEMPERATURE: 96.4 F | SYSTOLIC BLOOD PRESSURE: 126 MMHG | HEART RATE: 93 BPM | WEIGHT: 278.5 LBS | BODY MASS INDEX: 46.4 KG/M2

## 2024-02-29 DIAGNOSIS — R10.9 ABDOMINAL PAIN: Primary | ICD-10-CM

## 2024-02-29 PROCEDURE — 99213 OFFICE O/P EST LOW 20 MIN: CPT | Performed by: SURGERY

## 2024-02-29 NOTE — PROGRESS NOTES
OFFICE VISIT - BARIATRIC SURGERY  Milka Black 53 y.o. female MRN: [id number]  Unit/Bed#:  Encounter: [id number]      HPI:  Milka Black is a 53 y.o. female with history of removal of adjustable gastric band by Dr. Campuzano on 12/2021, here for follow-up to discuss results of recent complaints of abdominal pain.     The patient has had the lap band placed more than 10 years ago by Dr. Chao.    Subjective     The patient reports abdominal pain that she described as a twisting pain. It is intermittent provoked by body movement. Not associated with food or bowel movements.    Review of Systems   Constitutional:  Negative for chills and fever.   HENT:  Negative for ear pain and sore throat.    Eyes:  Negative for pain and visual disturbance.   Respiratory:  Negative for cough and shortness of breath.    Cardiovascular:  Negative for chest pain and palpitations.   Gastrointestinal:  Positive for abdominal pain. Negative for vomiting.   Genitourinary:  Negative for dysuria and hematuria.   Musculoskeletal:  Negative for arthralgias and back pain.   Skin:  Negative for color change and rash.   Neurological:  Negative for seizures and syncope.   All other systems reviewed and are negative.        Historical Information   Past Medical History:   Diagnosis Date    Arthritis     left knee    GERD (gastroesophageal reflux disease)     Hypertension     Morbid obesity (HCC) 8/3/2021    Wears glasses      Past Surgical History:   Procedure Laterality Date    ABDOMINAL SURGERY      CHOLECYSTECTOMY      COLOGUARD (HISTORICAL)      HYSTERECTOMY      LAPAROSCOPIC GASTRIC BANDING      AR LAPS ABD PRTM&OMENTUM DX W/WO SPEC BR/WA SPX N/A 8/3/2021    Procedure: LAPAROSCOPY , BAND TUBE RELEASE, LYSIS OF ADHESIONS, EGD;  Surgeon: Jimmy Campuzano MD;  Location: [facility];  Service: Bariatrics    REMOVAL GASTRIC BAND LAPAROSCOPIC N/A 12/28/2021    Procedure: LAP BAND REMOVAL, intraop egd;  Surgeon: Jimmy Campuzano MD;  Location: [facility]  "Main OR;  Service: Bariatrics     Social History   Social History     Substance and Sexual Activity   Alcohol Use Yes    Alcohol/week: 2.0 standard drinks of alcohol    Types: 2 Glasses of wine per week    Comment: once a week if that     Social History     Substance and Sexual Activity   Drug Use Never     Social History     Tobacco Use   Smoking Status Never   Smokeless Tobacco Never       Objective       Current Vitals:   Blood Pressure: 126/90 (02/29/24 1557)  Pulse: 93 (02/29/24 1557)  Temperature: (!) 96.4 °F (35.8 °C) (02/29/24 1557)  Temp Source: Tympanic (02/29/24 1557)  Height: 5' 5\" (165.1 cm) (02/29/24 1557)  Weight - Scale: 126 kg (278 lb 8 oz) (02/29/24 1557)  SpO2: 99 % (02/29/24 1557)    Invasive Devices       None                   Physical Exam  Vitals reviewed.   Constitutional:       General: She is not in acute distress.     Appearance: Normal appearance. She is obese. She is not ill-appearing.   HENT:      Head: Normocephalic and atraumatic.      Nose: Nose normal.      Mouth/Throat:      Mouth: Mucous membranes are moist.      Pharynx: Oropharynx is clear.   Eyes:      General: No scleral icterus.  Cardiovascular:      Rate and Rhythm: Normal rate and regular rhythm.   Pulmonary:      Effort: Pulmonary effort is normal. No respiratory distress.      Breath sounds: Normal breath sounds.   Abdominal:      General: There is no distension.      Palpations: Abdomen is soft.      Tenderness: There is no abdominal tenderness.      Hernia: No hernia is present.      Comments: No tenderness or distension on deep palpation.   Musculoskeletal:         General: Normal range of motion.   Skin:     General: Skin is warm and dry.      Capillary Refill: Capillary refill takes less than 2 seconds.   Neurological:      General: No focal deficit present.      Mental Status: She is alert. Mental status is at baseline.   Psychiatric:         Mood and Affect: Mood normal.         Behavior: Behavior normal. "             Pathology, and Other Studies: I have personally reviewed pertinent reports.        Assessment/PLAN:    Milka Black is a 53 y.o. female with history of lap band removal in 2021 with Dr. Campuzano, here to discuss complaints of abdominal pain.    Workup thus far reviewed and discussed with patient: positive for abnormal findings.  Workup includes:     --------------------------------------------------------------------    Recommend CT-scan of abdomen and pelvis with contrast to evaluate pain.  Follow-up with general surgery if she has persistent abdominal pain.            Parvez Aponte MD  Bariatric Surgery  2/29/2024  4:11 PM

## 2024-03-04 ENCOUNTER — TELEPHONE (OUTPATIENT)
Age: 54
End: 2024-03-04

## 2024-03-04 NOTE — TELEPHONE ENCOUNTER
Patricia from CT SCAN, called in to place order for pt to get blood work done before pt can get CT SCAN completed. Please place order for pt blood work and notify pt. Patricia can be reach at [phone] if any questions.

## 2024-03-05 ENCOUNTER — APPOINTMENT (OUTPATIENT)
Dept: LAB | Facility: HOSPITAL | Age: 54
End: 2024-03-05
Payer: COMMERCIAL

## 2024-03-05 DIAGNOSIS — Z13.220 SCREENING FOR LIPOID DISORDERS: ICD-10-CM

## 2024-03-05 DIAGNOSIS — Z82.49 FAMILY HISTORY OF ISCHEMIC HEART DISEASE: ICD-10-CM

## 2024-03-05 DIAGNOSIS — R79.9 ABNORMAL BLOOD CHEMISTRY: ICD-10-CM

## 2024-03-05 LAB
ALBUMIN SERPL BCP-MCNC: 4.2 G/DL (ref 3.5–5)
ALP SERPL-CCNC: 65 U/L (ref 34–104)
ALT SERPL W P-5'-P-CCNC: 20 U/L (ref 7–52)
ANION GAP SERPL CALCULATED.3IONS-SCNC: 11 MMOL/L
AST SERPL W P-5'-P-CCNC: 19 U/L (ref 13–39)
BILIRUB SERPL-MCNC: 0.74 MG/DL (ref 0.2–1)
BUN SERPL-MCNC: 22 MG/DL (ref 5–25)
CALCIUM SERPL-MCNC: 9.4 MG/DL (ref 8.4–10.2)
CHLORIDE SERPL-SCNC: 104 MMOL/L (ref 96–108)
CHOLEST SERPL-MCNC: 123 MG/DL
CO2 SERPL-SCNC: 26 MMOL/L (ref 21–32)
CREAT SERPL-MCNC: 1.03 MG/DL (ref 0.6–1.3)
ERYTHROCYTE [DISTWIDTH] IN BLOOD BY AUTOMATED COUNT: 13.9 % (ref 11.6–15.1)
EST. AVERAGE GLUCOSE BLD GHB EST-MCNC: 120 MG/DL
GFR SERPL CREATININE-BSD FRML MDRD: 62 ML/MIN/1.73SQ M
GLUCOSE P FAST SERPL-MCNC: 111 MG/DL (ref 65–99)
HBA1C MFR BLD: 5.8 %
HCT VFR BLD AUTO: 47.5 % (ref 34.8–46.1)
HDLC SERPL-MCNC: 37 MG/DL
HGB BLD-MCNC: 14.9 G/DL (ref 11.5–15.4)
LDLC SERPL CALC-MCNC: 72 MG/DL (ref 0–100)
MCH RBC QN AUTO: 27.2 PG (ref 26.8–34.3)
MCHC RBC AUTO-ENTMCNC: 31.4 G/DL (ref 31.4–37.4)
MCV RBC AUTO: 87 FL (ref 82–98)
NONHDLC SERPL-MCNC: 86 MG/DL
PLATELET # BLD AUTO: 259 THOUSANDS/UL (ref 149–390)
PMV BLD AUTO: 11 FL (ref 8.9–12.7)
POTASSIUM SERPL-SCNC: 3.6 MMOL/L (ref 3.5–5.3)
PROT SERPL-MCNC: 7.1 G/DL (ref 6.4–8.4)
RBC # BLD AUTO: 5.48 MILLION/UL (ref 3.81–5.12)
SODIUM SERPL-SCNC: 141 MMOL/L (ref 135–147)
TRIGL SERPL-MCNC: 69 MG/DL
WBC # BLD AUTO: 6.05 THOUSAND/UL (ref 4.31–10.16)

## 2024-03-05 PROCEDURE — 80061 LIPID PANEL: CPT

## 2024-03-05 PROCEDURE — 80053 COMPREHEN METABOLIC PANEL: CPT

## 2024-03-05 PROCEDURE — 83036 HEMOGLOBIN GLYCOSYLATED A1C: CPT

## 2024-03-05 PROCEDURE — 85027 COMPLETE CBC AUTOMATED: CPT

## 2024-03-05 PROCEDURE — 36415 COLL VENOUS BLD VENIPUNCTURE: CPT

## 2024-03-08 ENCOUNTER — TELEPHONE (OUTPATIENT)
Dept: BARIATRICS | Facility: CLINIC | Age: 54
End: 2024-03-08

## 2024-03-08 ENCOUNTER — TELEPHONE (OUTPATIENT)
Age: 54
End: 2024-03-08

## 2024-03-08 NOTE — TELEPHONE ENCOUNTER
Spoke to patient about cancelling CT scan on 3/11. Provider needs to speak with Insurance. Let patient know to schedule 2 weeks out

## 2024-06-28 ENCOUNTER — TELEPHONE (OUTPATIENT)
Age: 54
End: 2024-06-28

## 2024-06-28 ENCOUNTER — PREP FOR PROCEDURE (OUTPATIENT)
Age: 54
End: 2024-06-28

## 2024-06-28 DIAGNOSIS — Z12.11 SCREENING FOR COLON CANCER: Primary | ICD-10-CM

## 2024-06-28 NOTE — TELEPHONE ENCOUNTER
06/28/24  Screened by: Norah Calderon    Referring Provider Rehrig    Pre- Screening:     There is no height or weight on file to calculate BMI.  Has patient been referred for a routine screening Colonoscopy? yes  Is the patient between 45-75 years old? yes      Previous Colonoscopy no   If yes:    Date:     Facility:     Reason:       Does the patient want to see a Gastroenterologist prior to their procedure OR are they having any GI symptoms? no    Has the patient been hospitalized or had abdominal surgery in the past 6 months? no    Does the patient use supplemental oxygen? no    Does the patient take Coumadin, Lovenox, Plavix, Elliquis, Xarelto, or other blood thinning medication? no    Has the patient had a stroke, cardiac event, or stent placed in the past year? no

## 2024-06-28 NOTE — TELEPHONE ENCOUNTER
Scheduled date of colonoscopy (as of today): 7/24/24    Physician performing colonoscopy: Kaila    Location of colonoscopy: [facility]    Bowel prep reviewed with patient:  COLTON/BOLIVAR    Instructions reviewed with patient by: mateo    Clearances: na    OA  Screening  Mychart

## 2024-07-03 ENCOUNTER — APPOINTMENT (OUTPATIENT)
Dept: LAB | Facility: HOSPITAL | Age: 54
End: 2024-07-03

## 2024-07-03 DIAGNOSIS — Z00.8 HEALTH EXAMINATION IN POPULATION SURVEY: ICD-10-CM

## 2024-07-03 LAB
CHOLEST SERPL-MCNC: 145 MG/DL
EST. AVERAGE GLUCOSE BLD GHB EST-MCNC: 120 MG/DL
HBA1C MFR BLD: 5.8 %
HDLC SERPL-MCNC: 42 MG/DL
LDLC SERPL CALC-MCNC: 86 MG/DL (ref 0–100)
NONHDLC SERPL-MCNC: 103 MG/DL
TRIGL SERPL-MCNC: 83 MG/DL

## 2024-07-03 PROCEDURE — 83036 HEMOGLOBIN GLYCOSYLATED A1C: CPT

## 2024-07-03 PROCEDURE — 80061 LIPID PANEL: CPT

## 2024-07-03 PROCEDURE — 36415 COLL VENOUS BLD VENIPUNCTURE: CPT

## 2024-07-10 ENCOUNTER — HOSPITAL ENCOUNTER (OUTPATIENT)
Dept: MAMMOGRAPHY | Facility: HOSPITAL | Age: 54
Discharge: HOME | End: 2024-07-10
Attending: FAMILY MEDICINE
Payer: COMMERCIAL

## 2024-07-10 DIAGNOSIS — Z12.31 ENCOUNTER FOR SCREENING MAMMOGRAM FOR MALIGNANT NEOPLASM OF BREAST: ICD-10-CM

## 2024-07-10 PROCEDURE — 77067 SCR MAMMO BI INCL CAD: CPT

## 2024-07-10 PROCEDURE — 77063 BREAST TOMOSYNTHESIS BI: CPT

## 2024-07-24 ENCOUNTER — HOSPITAL ENCOUNTER (OUTPATIENT)
Dept: GASTROENTEROLOGY | Facility: HOSPITAL | Age: 54
Setting detail: OUTPATIENT SURGERY
Discharge: HOME | End: 2024-07-24
Attending: INTERNAL MEDICINE
Payer: COMMERCIAL

## 2024-07-24 ENCOUNTER — ANESTHESIA (OUTPATIENT)
Dept: GASTROENTEROLOGY | Facility: HOSPITAL | Age: 54
End: 2024-07-24

## 2024-07-24 ENCOUNTER — ANESTHESIA EVENT (OUTPATIENT)
Dept: GASTROENTEROLOGY | Facility: HOSPITAL | Age: 54
End: 2024-07-24

## 2024-07-24 VITALS
RESPIRATION RATE: 16 BRPM | SYSTOLIC BLOOD PRESSURE: 126 MMHG | DIASTOLIC BLOOD PRESSURE: 75 MMHG | TEMPERATURE: 97.8 F | HEART RATE: 64 BPM | OXYGEN SATURATION: 99 %

## 2024-07-24 DIAGNOSIS — Z12.11 SCREENING FOR COLON CANCER: ICD-10-CM

## 2024-07-24 PROCEDURE — 88305 TISSUE EXAM BY PATHOLOGIST: CPT | Performed by: PATHOLOGY

## 2024-07-24 PROCEDURE — 45385 COLONOSCOPY W/LESION REMOVAL: CPT | Performed by: INTERNAL MEDICINE

## 2024-07-24 RX ORDER — SEMAGLUTIDE 0.5 MG/.5ML
0.5 INJECTION, SOLUTION SUBCUTANEOUS WEEKLY
COMMUNITY

## 2024-07-24 RX ORDER — PROPOFOL 10 MG/ML
INJECTION, EMULSION INTRAVENOUS AS NEEDED
Status: DISCONTINUED | OUTPATIENT
Start: 2024-07-24 | End: 2024-07-24

## 2024-07-24 RX ORDER — SODIUM CHLORIDE, SODIUM LACTATE, POTASSIUM CHLORIDE, CALCIUM CHLORIDE 600; 310; 30; 20 MG/100ML; MG/100ML; MG/100ML; MG/100ML
125 INJECTION, SOLUTION INTRAVENOUS CONTINUOUS
Status: DISCONTINUED | OUTPATIENT
Start: 2024-07-24 | End: 2024-07-28 | Stop reason: HOSPADM

## 2024-07-24 RX ORDER — PROPOFOL 10 MG/ML
INJECTION, EMULSION INTRAVENOUS CONTINUOUS PRN
Status: DISCONTINUED | OUTPATIENT
Start: 2024-07-24 | End: 2024-07-24

## 2024-07-24 RX ADMIN — PROPOFOL 100 MG: 10 INJECTION, EMULSION INTRAVENOUS at 08:05

## 2024-07-24 RX ADMIN — PROPOFOL 120 MCG/KG/MIN: 10 INJECTION, EMULSION INTRAVENOUS at 08:05

## 2024-07-24 RX ADMIN — SODIUM CHLORIDE, SODIUM LACTATE, POTASSIUM CHLORIDE, AND CALCIUM CHLORIDE 125 ML/HR: .6; .31; .03; .02 INJECTION, SOLUTION INTRAVENOUS at 07:25

## 2024-07-24 NOTE — ANESTHESIA PREPROCEDURE EVALUATION
Procedure:  COLONOSCOPY    Relevant Problems   GI/HEPATIC   (+) Small bowel obstruction (HCC)      MUSCULOSKELETAL   (+) Primary osteoarthritis of left knee      Surgery/Wound/Pain   (+) Generalized abdominal pain   (+) Hx of laparoscopic adjustable gastric banding      Other   (+) Severe obesity (HCC)             Anesthesia Plan  ASA Score- 2     Anesthesia Type- IV sedation with anesthesia with ASA Monitors.         Additional Monitors:     Airway Plan:            Plan Factors-    Chart reviewed.                      Induction- intravenous.    Postoperative Plan-         Informed Consent- Anesthetic plan and risks discussed with patient.  I personally reviewed this patient with the CRNA. Discussed and agreed on the Anesthesia Plan with the CRNA..

## 2024-07-24 NOTE — H&P
History and Physical - [facility]  Milka Black 54 y.o. female MRN: [id number]                  HPI: Milka Black is a 54 y.o. year old female who presents for colonoscopy      REVIEW OF SYSTEMS: Per the HPI, and otherwise unremarkable.    Historical Information   Past Medical History:   Diagnosis Date    Arthritis     left knee    GERD (gastroesophageal reflux disease)     Hypertension     Morbid obesity (HCC) 8/3/2021    Wears glasses      Past Surgical History:   Procedure Laterality Date    ABDOMINAL SURGERY      CHOLECYSTECTOMY      COLOGUARD (HISTORICAL)      HYSTERECTOMY      LAPAROSCOPIC GASTRIC BANDING      CT LAPS ABD PRTM&OMENTUM DX W/WO SPEC BR/WA SPX N/A 8/3/2021    Procedure: LAPAROSCOPY , BAND TUBE RELEASE, LYSIS OF ADHESIONS, EGD;  Surgeon: Jimmy Campuzano MD;  Location: [facility];  Service: Bariatrics    REMOVAL GASTRIC BAND LAPAROSCOPIC N/A 12/28/2021    Procedure: LAP BAND REMOVAL, intraop egd;  Surgeon: Jimmy Campuzano MD;  Location: [facility];  Service: Bariatrics     Social History   Social History     Substance and Sexual Activity   Alcohol Use Yes    Alcohol/week: 2.0 standard drinks of alcohol    Types: 2 Glasses of wine per week    Comment: once a week if that     Social History     Substance and Sexual Activity   Drug Use Never     Social History     Tobacco Use   Smoking Status Never   Smokeless Tobacco Never     Family History   Problem Relation Age of Onset    Diabetes Mother     Hypertension Mother     Hypertension Father     No Known Problems Sister     No Known Problems Sister     No Known Problems Sister     Lung cancer Sister     No Known Problems Sister     Breast cancer Maternal Grandmother     Cancer Maternal Grandmother         breast    No Known Problems Maternal Grandfather     Stroke Paternal Grandmother     Stroke Paternal Grandfather     No Known Problems Maternal Aunt     Alcohol abuse Neg Hx     Arthritis Neg Hx     Asthma Neg Hx     Birth  defects Neg Hx     COPD Neg Hx     Depression Neg Hx     Drug abuse Neg Hx     Early death Neg Hx     Heart disease Neg Hx     Hyperlipidemia Neg Hx     Hearing loss Neg Hx     Kidney disease Neg Hx     Learning disabilities Neg Hx     Mental illness Neg Hx     Mental retardation Neg Hx     Miscarriages / Stillbirths Neg Hx     Vision loss Neg Hx        Meds/Allergies       Current Outpatient Medications:     hydrochlorothiazide (HYDRODIURIL) 12.5 mg tablet    potassium chloride (MICRO-K) 10 MEQ CR capsule    Semaglutide-Weight Management (Wegovy) 0.5 MG/0.5ML    aspirin (ECOTRIN LOW STRENGTH) 81 mg EC tablet    Childrens Silapap 160 MG/5ML liquid    furosemide (LASIX) 20 mg tablet    ibuprofen (MOTRIN) 200 mg tablet    Insulin Pen Needle (NovoFine Plus Pen Needle) 32G X 4 MM MISC    omeprazole (PriLOSEC) 20 mg delayed release capsule    oxyCODONE (Roxicodone) 5 immediate release tablet    phentermine (ADIPEX-P) 37.5 MG tablet    topiramate (Topamax) 50 MG tablet    Current Facility-Administered Medications:     lactated ringers infusion, 125 mL/hr, Intravenous, Continuous, 125 mL/hr at 07/24/24 0725    No Known Allergies    Objective     /72   Pulse 72   Temp (!) 97.1 °F (36.2 °C) (Temporal)   Resp 18   SpO2 (!) 7%       PHYSICAL EXAM    Gen: NAD  Head: NCAT  CV: RRR  CHEST: Clear  ABD: soft, NT/ND  EXT: no edema      ASSESSMENT/PLAN:  This is a 54 y.o. year old female here for colonoscopy, and she is stable and optimized for her procedure.

## 2024-07-24 NOTE — ANESTHESIA POSTPROCEDURE EVALUATION
Post-Op Assessment Note    CV Status:  Stable  Pain Score: 0    Pain management: adequate       Mental Status:  Alert and awake   Hydration Status:  Euvolemic   PONV Controlled:  Controlled   Airway Patency:  Patent     Post Op Vitals Reviewed: Yes    No anethesia notable event occurred.    Staff: CRNA               BP   120/69   Temp 97.5   Pulse 76   Resp 15   SpO2 98

## 2024-07-29 PROCEDURE — 88305 TISSUE EXAM BY PATHOLOGIST: CPT | Performed by: PATHOLOGY

## 2025-07-24 ENCOUNTER — APPOINTMENT (OUTPATIENT)
Dept: LAB | Facility: HOSPITAL | Age: 55
End: 2025-07-24
Payer: COMMERCIAL

## 2025-07-24 DIAGNOSIS — Z00.8 HEALTH EXAMINATION IN POPULATION SURVEY: ICD-10-CM

## 2025-07-24 LAB
CHOLEST SERPL-MCNC: 139 MG/DL (ref ?–200)
EST. AVERAGE GLUCOSE BLD GHB EST-MCNC: 108 MG/DL
HBA1C MFR BLD: 5.4 %
HDLC SERPL-MCNC: 47 MG/DL
LDLC SERPL CALC-MCNC: 78 MG/DL (ref 0–100)
NONHDLC SERPL-MCNC: 92 MG/DL
TRIGL SERPL-MCNC: 70 MG/DL (ref ?–150)

## 2025-07-24 PROCEDURE — 36415 COLL VENOUS BLD VENIPUNCTURE: CPT

## 2025-07-24 PROCEDURE — 83036 HEMOGLOBIN GLYCOSYLATED A1C: CPT

## 2025-07-24 PROCEDURE — 80061 LIPID PANEL: CPT

## (undated) DEVICE — TUBING SUCTION 5MM X 12 FT

## (undated) DEVICE — HARMONIC 1100 SHEARS, 36CM SHAFT LENGTH: Brand: HARMONIC

## (undated) DEVICE — IRRIG ENDO FLO TUBING

## (undated) DEVICE — TUBING SMOKE EVAC W/FILTRATION DEVICE PLUMEPORT ACTIV

## (undated) DEVICE — VIAL DECANTER

## (undated) DEVICE — VIOLET BRAIDED (POLYGLACTIN 910), SYNTHETIC ABSORBABLE SUTURE: Brand: COATED VICRYL

## (undated) DEVICE — PMI DISPOSABLE PUNCTURE CLOSURE DEVICE / SUTURE GRASPER: Brand: PMI

## (undated) DEVICE — CHLORAPREP HI-LITE 26ML ORANGE

## (undated) DEVICE — INTENDED FOR TISSUE SEPARATION, AND OTHER PROCEDURES THAT REQUIRE A SHARP SURGICAL BLADE TO PUNCTURE OR CUT.: Brand: BARD-PARKER SAFETY BLADES SIZE 15, STERILE

## (undated) DEVICE — NEEDLE HYPO 22G X 1-1/2 IN

## (undated) DEVICE — SPRAY SET ENDO 360DEG GAS ASSIST FLEX APPLICATOR DUPLOSPRAY

## (undated) DEVICE — SUT MONOCRYL 4-0 PS-2 27 IN Y426H

## (undated) DEVICE — ADHESIVE SKIN CLSR DERMABOND NX

## (undated) DEVICE — ASTOUND STANDARD SURGICAL GOWN, XL: Brand: CONVERTORS

## (undated) DEVICE — TROCAR VISIPORT

## (undated) DEVICE — TROCAR: Brand: KII® SLEEVE

## (undated) DEVICE — SYRINGE 30ML LL

## (undated) DEVICE — TROCAR: Brand: KII FIOS FIRST ENTRY

## (undated) DEVICE — WEBRIL 6 IN UNSTERILE

## (undated) DEVICE — GLOVE INDICATOR PI UNDERGLOVE SZ 6.5 BLUE

## (undated) DEVICE — SCD SEQUENTIAL COMPRESSION COMFORT SLEEVE MEDIUM KNEE LENGTH: Brand: KENDALL SCD

## (undated) DEVICE — Device: Brand: DEFENDO AIR/WATER/SUCTION AND BIOPSY VALVE

## (undated) DEVICE — VISUALIZATION SYSTEM: Brand: CLEARIFY

## (undated) DEVICE — ALLENTOWN LAP CHOLE APP PACK: Brand: CARDINAL HEALTH

## (undated) DEVICE — SURGICAL GOWN, XL SMARTSLEEVE: Brand: CONVERTORS

## (undated) DEVICE — GLOVE SRG BIOGEL 8

## (undated) DEVICE — DRAPE EQUIPMENT RF WAND

## (undated) DEVICE — LAPAROSCOPIC TROCAR SLEEVE/SINGLE USE: Brand: KII® SLEEVE

## (undated) DEVICE — SEALANT FIBRIN VISTASEAL 10ML

## (undated) DEVICE — ELECTRODE LAP J HOOK E-Z CLEAN 33CM-0021

## (undated) DEVICE — SYRINGE 20ML LL

## (undated) DEVICE — [HIGH FLOW INSUFFLATOR,  DO NOT USE IF PACKAGE IS DAMAGED,  KEEP DRY,  KEEP AWAY FROM SUNLIGHT,  PROTECT FROM HEAT AND RADIOACTIVE SOURCES.]: Brand: PNEUMOSURE

## (undated) DEVICE — BAG DECANTER

## (undated) DEVICE — ENDOPATH PNEUMONEEDLE INSUFFLATION NEEDLES WITH LUER LOCK CONNECTORS 120MM: Brand: ENDOPATH

## (undated) DEVICE — TIBURON LAPAROSCOPIC ABDOMINAL DRAPE: Brand: CONVERTORS

## (undated) DEVICE — GLOVE SRG BIOGEL 7

## (undated) DEVICE — TRAVELKIT CONTAINS FIRST STEP KIT (200ML EP-4 KIT) AND SOILED SCOPE BAG - 1 KIT: Brand: TRAVELKIT CONTAINS FIRST STEP KIT AND SOILED SCOPE BAG

## (undated) DEVICE — LAPAROSCOPIC DUAL RIGID APPLICATOR: Brand: ETHICON

## (undated) DEVICE — NEEDLE SPINAL18G X 3.5 IN QUINCKE

## (undated) DEVICE — 3000CC GUARDIAN II: Brand: GUARDIAN

## (undated) DEVICE — SUT VICRYL 0 UR-6 27 IN J603H

## (undated) DEVICE — ENDOPATH 5MM CURVED SCISSORS WITH MONOPOLAR CAUTERY: Brand: ENDOPATH

## (undated) DEVICE — ANTI-FOG SOLUTION WITH FOAM PAD: Brand: DEVON

## (undated) DEVICE — PENCIL ELECTROSURG E-Z CLEAN -0035H

## (undated) DEVICE — GLOVE SRG BIOGEL 6